# Patient Record
Sex: MALE | Race: WHITE | NOT HISPANIC OR LATINO | Employment: FULL TIME | URBAN - METROPOLITAN AREA
[De-identification: names, ages, dates, MRNs, and addresses within clinical notes are randomized per-mention and may not be internally consistent; named-entity substitution may affect disease eponyms.]

---

## 2021-06-23 ENCOUNTER — HOSPITAL ENCOUNTER (OUTPATIENT)
Dept: CARDIOLOGY | Facility: HOSPITAL | Age: 51
Discharge: HOME | End: 2021-06-23

## 2021-06-23 ENCOUNTER — LAB (OUTPATIENT)
Dept: LAB | Facility: HOSPITAL | Age: 51
End: 2021-06-23

## 2021-06-23 ENCOUNTER — TRANSCRIBE ORDERS (OUTPATIENT)
Dept: ADMINISTRATIVE | Facility: HOSPITAL | Age: 51
End: 2021-06-23

## 2021-06-23 ENCOUNTER — HOSPITAL ENCOUNTER (OUTPATIENT)
Dept: GENERAL RADIOLOGY | Facility: HOSPITAL | Age: 51
Discharge: HOME | End: 2021-06-23

## 2021-06-23 DIAGNOSIS — Z01.818 OTHER SPECIFIED PRE-OPERATIVE EXAMINATION: Primary | ICD-10-CM

## 2021-06-23 DIAGNOSIS — Z01.818 OTHER SPECIFIED PRE-OPERATIVE EXAMINATION: ICD-10-CM

## 2021-06-23 LAB
ABO GROUP BLD: NORMAL
ALBUMIN SERPL-MCNC: 5 G/DL (ref 3.5–5.2)
ANION GAP SERPL CALCULATED.3IONS-SCNC: 11.5 MMOL/L (ref 5–15)
APTT PPP: 24.2 SECONDS (ref 24–31)
BLD GP AB SCN SERPL QL: NEGATIVE
BUN SERPL-MCNC: 18 MG/DL (ref 6–20)
BUN/CREAT SERPL: 25 (ref 7–25)
CALCIUM SPEC-SCNC: 9.7 MG/DL (ref 8.6–10.5)
CHLORIDE SERPL-SCNC: 98 MMOL/L (ref 98–107)
CO2 SERPL-SCNC: 29.5 MMOL/L (ref 22–29)
CREAT SERPL-MCNC: 0.72 MG/DL (ref 0.76–1.27)
DEPRECATED RDW RBC AUTO: 40.1 FL (ref 37–54)
ERYTHROCYTE [DISTWIDTH] IN BLOOD BY AUTOMATED COUNT: 12.1 % (ref 12.3–15.4)
GFR SERPL CREATININE-BSD FRML MDRD: 116 ML/MIN/1.73
GLUCOSE SERPL-MCNC: 91 MG/DL (ref 65–99)
HBA1C MFR BLD: 5.3 % (ref 3.5–5.6)
HCT VFR BLD AUTO: 49.4 % (ref 37.5–51)
HGB BLD-MCNC: 16.9 G/DL (ref 13–17.7)
INR PPP: 0.98 (ref 0.93–1.1)
MCH RBC QN AUTO: 30.8 PG (ref 26.6–33)
MCHC RBC AUTO-ENTMCNC: 34.2 G/DL (ref 31.5–35.7)
MCV RBC AUTO: 90.1 FL (ref 79–97)
MRSA DNA SPEC QL NAA+PROBE: NORMAL
PLATELET # BLD AUTO: 278 10*3/MM3 (ref 140–450)
PMV BLD AUTO: 9.7 FL (ref 6–12)
POTASSIUM SERPL-SCNC: 4.4 MMOL/L (ref 3.5–5.2)
PROTHROMBIN TIME: 10.9 SECONDS (ref 9.6–11.7)
RBC # BLD AUTO: 5.48 10*6/MM3 (ref 4.14–5.8)
RH BLD: NEGATIVE
SODIUM SERPL-SCNC: 139 MMOL/L (ref 136–145)
T&S EXPIRATION DATE: NORMAL
WBC # BLD AUTO: 6.9 10*3/MM3 (ref 3.4–10.8)

## 2021-06-23 PROCEDURE — 85027 COMPLETE CBC AUTOMATED: CPT | Performed by: ORTHOPAEDIC SURGERY

## 2021-06-23 PROCEDURE — 80048 BASIC METABOLIC PNL TOTAL CA: CPT | Performed by: ORTHOPAEDIC SURGERY

## 2021-06-23 PROCEDURE — 86900 BLOOD TYPING SEROLOGIC ABO: CPT | Performed by: ORTHOPAEDIC SURGERY

## 2021-06-23 PROCEDURE — 85730 THROMBOPLASTIN TIME PARTIAL: CPT | Performed by: ORTHOPAEDIC SURGERY

## 2021-06-23 PROCEDURE — 86850 RBC ANTIBODY SCREEN: CPT | Performed by: ORTHOPAEDIC SURGERY

## 2021-06-23 PROCEDURE — 86900 BLOOD TYPING SEROLOGIC ABO: CPT

## 2021-06-23 PROCEDURE — 82040 ASSAY OF SERUM ALBUMIN: CPT

## 2021-06-23 PROCEDURE — 36415 COLL VENOUS BLD VENIPUNCTURE: CPT | Performed by: ORTHOPAEDIC SURGERY

## 2021-06-23 PROCEDURE — 83036 HEMOGLOBIN GLYCOSYLATED A1C: CPT | Performed by: ORTHOPAEDIC SURGERY

## 2021-06-23 PROCEDURE — 93005 ELECTROCARDIOGRAM TRACING: CPT

## 2021-06-23 PROCEDURE — 87641 MR-STAPH DNA AMP PROBE: CPT

## 2021-06-23 PROCEDURE — 85610 PROTHROMBIN TIME: CPT | Performed by: ORTHOPAEDIC SURGERY

## 2021-06-23 PROCEDURE — 86901 BLOOD TYPING SEROLOGIC RH(D): CPT | Performed by: ORTHOPAEDIC SURGERY

## 2021-06-23 PROCEDURE — 93010 ELECTROCARDIOGRAM REPORT: CPT | Performed by: INTERNAL MEDICINE

## 2021-06-23 PROCEDURE — 71046 X-RAY EXAM CHEST 2 VIEWS: CPT

## 2021-06-23 PROCEDURE — 86901 BLOOD TYPING SEROLOGIC RH(D): CPT

## 2021-06-23 RX ORDER — LISINOPRIL 10 MG/1
10 TABLET ORAL DAILY
COMMUNITY

## 2021-06-23 RX ORDER — ACETAMINOPHEN 325 MG/1
650 TABLET ORAL EVERY 6 HOURS PRN
COMMUNITY

## 2021-06-24 LAB — QT INTERVAL: 466 MS

## 2021-06-25 ENCOUNTER — LAB (OUTPATIENT)
Dept: LAB | Facility: HOSPITAL | Age: 51
End: 2021-06-25

## 2021-06-25 LAB — SARS-COV-2 ORF1AB RESP QL NAA+PROBE: NOT DETECTED

## 2021-06-25 PROCEDURE — U0004 COV-19 TEST NON-CDC HGH THRU: HCPCS

## 2021-06-25 PROCEDURE — C9803 HOPD COVID-19 SPEC COLLECT: HCPCS

## 2021-06-28 ENCOUNTER — HOSPITAL ENCOUNTER (OUTPATIENT)
Facility: HOSPITAL | Age: 51
Setting detail: HOSPITAL OUTPATIENT SURGERY
Discharge: HOME | End: 2021-06-28
Attending: ORTHOPAEDIC SURGERY | Admitting: ORTHOPAEDIC SURGERY

## 2021-06-28 ENCOUNTER — APPOINTMENT (OUTPATIENT)
Dept: GENERAL RADIOLOGY | Facility: HOSPITAL | Age: 51
End: 2021-06-28

## 2021-06-28 ENCOUNTER — ANESTHESIA (OUTPATIENT)
Dept: PERIOP | Facility: HOSPITAL | Age: 51
End: 2021-06-28

## 2021-06-28 ENCOUNTER — ANESTHESIA EVENT (OUTPATIENT)
Dept: PERIOP | Facility: HOSPITAL | Age: 51
End: 2021-06-28

## 2021-06-28 VITALS
HEART RATE: 56 BPM | SYSTOLIC BLOOD PRESSURE: 103 MMHG | TEMPERATURE: 98 F | RESPIRATION RATE: 16 BRPM | WEIGHT: 175 LBS | OXYGEN SATURATION: 94 % | BODY MASS INDEX: 27.47 KG/M2 | HEIGHT: 67 IN | DIASTOLIC BLOOD PRESSURE: 56 MMHG

## 2021-06-28 DIAGNOSIS — Z87.81 S/P ORIF (OPEN REDUCTION INTERNAL FIXATION) FRACTURE: Primary | ICD-10-CM

## 2021-06-28 DIAGNOSIS — Z98.890 S/P ORIF (OPEN REDUCTION INTERNAL FIXATION) FRACTURE: Primary | ICD-10-CM

## 2021-06-28 PROCEDURE — C1713 ANCHOR/SCREW BN/BN,TIS/BN: HCPCS | Performed by: ORTHOPAEDIC SURGERY

## 2021-06-28 PROCEDURE — 76000 FLUOROSCOPY <1 HR PHYS/QHP: CPT

## 2021-06-28 PROCEDURE — 25010000002 MAGNESIUM SULFATE PER 500 MG OF MAGNESIUM: Performed by: NURSE ANESTHETIST, CERTIFIED REGISTERED

## 2021-06-28 PROCEDURE — 25010000002 MIDAZOLAM PER 1 MG: Performed by: NURSE ANESTHETIST, CERTIFIED REGISTERED

## 2021-06-28 PROCEDURE — 25010000002 NEOSTIGMINE 5 MG/5ML SOLUTION: Performed by: NURSE ANESTHETIST, CERTIFIED REGISTERED

## 2021-06-28 PROCEDURE — 73000 X-RAY EXAM OF COLLAR BONE: CPT

## 2021-06-28 PROCEDURE — 25010000002 ROPIVACAINE PER 1 MG: Performed by: ORTHOPAEDIC SURGERY

## 2021-06-28 PROCEDURE — C1889 IMPLANT/INSERT DEVICE, NOC: HCPCS | Performed by: ORTHOPAEDIC SURGERY

## 2021-06-28 PROCEDURE — 25010000002 ONDANSETRON PER 1 MG: Performed by: NURSE ANESTHETIST, CERTIFIED REGISTERED

## 2021-06-28 PROCEDURE — 25010000002 PROPOFOL 10 MG/ML EMULSION: Performed by: NURSE ANESTHETIST, CERTIFIED REGISTERED

## 2021-06-28 DEVICE — DEV WND/CLS CONTRL TISS STRATAFIX SYMM PDS PLS CTX 60CM VIL: Type: IMPLANTABLE DEVICE | Site: CLAVICLE | Status: FUNCTIONAL

## 2021-06-28 DEVICE — BONE SCREW
Type: IMPLANTABLE DEVICE | Site: CLAVICLE | Status: FUNCTIONAL
Brand: VARIAX

## 2021-06-28 DEVICE — SUPERIOR PLATE - DECREASED CURVATURE
Type: IMPLANTABLE DEVICE | Site: CLAVICLE | Status: FUNCTIONAL
Brand: VARIAX

## 2021-06-28 RX ORDER — PROPOFOL 10 MG/ML
VIAL (ML) INTRAVENOUS AS NEEDED
Status: DISCONTINUED | OUTPATIENT
Start: 2021-06-28 | End: 2021-06-28 | Stop reason: SURG

## 2021-06-28 RX ORDER — MIDAZOLAM HYDROCHLORIDE 1 MG/ML
1 INJECTION INTRAMUSCULAR; INTRAVENOUS
Status: DISCONTINUED | OUTPATIENT
Start: 2021-06-28 | End: 2021-06-28 | Stop reason: HOSPADM

## 2021-06-28 RX ORDER — MIDAZOLAM HYDROCHLORIDE 1 MG/ML
INJECTION INTRAMUSCULAR; INTRAVENOUS AS NEEDED
Status: DISCONTINUED | OUTPATIENT
Start: 2021-06-28 | End: 2021-06-28 | Stop reason: SURG

## 2021-06-28 RX ORDER — OXYCODONE HCL 10 MG/1
10 TABLET, FILM COATED, EXTENDED RELEASE ORAL ONCE
Status: COMPLETED | OUTPATIENT
Start: 2021-06-28 | End: 2021-06-28

## 2021-06-28 RX ORDER — CELECOXIB 200 MG/1
200 CAPSULE ORAL ONCE
Status: COMPLETED | OUTPATIENT
Start: 2021-06-28 | End: 2021-06-28

## 2021-06-28 RX ORDER — NALOXONE HCL 0.4 MG/ML
0.4 VIAL (ML) INJECTION AS NEEDED
Status: DISCONTINUED | OUTPATIENT
Start: 2021-06-28 | End: 2021-06-28 | Stop reason: HOSPADM

## 2021-06-28 RX ORDER — ROPIVACAINE HYDROCHLORIDE 5 MG/ML
INJECTION, SOLUTION EPIDURAL; INFILTRATION; PERINEURAL AS NEEDED
Status: DISCONTINUED | OUTPATIENT
Start: 2021-06-28 | End: 2021-06-28 | Stop reason: HOSPADM

## 2021-06-28 RX ORDER — ROCURONIUM BROMIDE 10 MG/ML
INJECTION, SOLUTION INTRAVENOUS AS NEEDED
Status: DISCONTINUED | OUTPATIENT
Start: 2021-06-28 | End: 2021-06-28 | Stop reason: SURG

## 2021-06-28 RX ORDER — DROPERIDOL 2.5 MG/ML
1.25 INJECTION, SOLUTION INTRAMUSCULAR; INTRAVENOUS ONCE AS NEEDED
Status: DISCONTINUED | OUTPATIENT
Start: 2021-06-28 | End: 2021-06-28 | Stop reason: HOSPADM

## 2021-06-28 RX ORDER — ACETAMINOPHEN 500 MG
1000 TABLET ORAL ONCE
Status: COMPLETED | OUTPATIENT
Start: 2021-06-28 | End: 2021-06-28

## 2021-06-28 RX ORDER — MAGNESIUM SULFATE HEPTAHYDRATE 500 MG/ML
INJECTION, SOLUTION INTRAMUSCULAR; INTRAVENOUS AS NEEDED
Status: DISCONTINUED | OUTPATIENT
Start: 2021-06-28 | End: 2021-06-28 | Stop reason: SURG

## 2021-06-28 RX ORDER — SODIUM CHLORIDE 0.9 % (FLUSH) 0.9 %
10 SYRINGE (ML) INJECTION AS NEEDED
Status: DISCONTINUED | OUTPATIENT
Start: 2021-06-28 | End: 2021-06-28 | Stop reason: HOSPADM

## 2021-06-28 RX ORDER — TRANEXAMIC ACID 10 MG/ML
1000 INJECTION, SOLUTION INTRAVENOUS ONCE
Status: COMPLETED | OUTPATIENT
Start: 2021-06-28 | End: 2021-06-28

## 2021-06-28 RX ORDER — HYDROCODONE BITARTRATE AND ACETAMINOPHEN 7.5; 325 MG/1; MG/1
1 TABLET ORAL EVERY 6 HOURS PRN
Qty: 30 TABLET | Refills: 0 | Status: SHIPPED | OUTPATIENT
Start: 2021-06-28

## 2021-06-28 RX ORDER — LIDOCAINE HYDROCHLORIDE 10 MG/ML
0.5 INJECTION, SOLUTION INFILTRATION; PERINEURAL ONCE AS NEEDED
Status: DISCONTINUED | OUTPATIENT
Start: 2021-06-28 | End: 2021-06-28 | Stop reason: HOSPADM

## 2021-06-28 RX ORDER — IPRATROPIUM BROMIDE AND ALBUTEROL SULFATE 2.5; .5 MG/3ML; MG/3ML
3 SOLUTION RESPIRATORY (INHALATION) ONCE AS NEEDED
Status: DISCONTINUED | OUTPATIENT
Start: 2021-06-28 | End: 2021-06-28 | Stop reason: HOSPADM

## 2021-06-28 RX ORDER — ACETAMINOPHEN 325 MG/1
650 TABLET ORAL ONCE AS NEEDED
Status: DISCONTINUED | OUTPATIENT
Start: 2021-06-28 | End: 2021-06-28 | Stop reason: HOSPADM

## 2021-06-28 RX ORDER — PHENYLEPHRINE HCL IN 0.9% NACL 1 MG/10 ML
SYRINGE (ML) INTRAVENOUS AS NEEDED
Status: DISCONTINUED | OUTPATIENT
Start: 2021-06-28 | End: 2021-06-28 | Stop reason: SURG

## 2021-06-28 RX ORDER — SODIUM CHLORIDE, SODIUM LACTATE, POTASSIUM CHLORIDE, CALCIUM CHLORIDE 600; 310; 30; 20 MG/100ML; MG/100ML; MG/100ML; MG/100ML
1000 INJECTION, SOLUTION INTRAVENOUS CONTINUOUS
Status: DISCONTINUED | OUTPATIENT
Start: 2021-06-28 | End: 2021-06-28 | Stop reason: HOSPADM

## 2021-06-28 RX ORDER — TRAMADOL HYDROCHLORIDE 50 MG/1
50 TABLET ORAL EVERY 6 HOURS PRN
Qty: 30 TABLET | Refills: 0 | Status: SHIPPED | OUTPATIENT
Start: 2021-06-28

## 2021-06-28 RX ORDER — ONDANSETRON 2 MG/ML
4 INJECTION INTRAMUSCULAR; INTRAVENOUS ONCE AS NEEDED
Status: DISCONTINUED | OUTPATIENT
Start: 2021-06-28 | End: 2021-06-28 | Stop reason: HOSPADM

## 2021-06-28 RX ORDER — GLYCOPYRROLATE 1 MG/5 ML
SYRINGE (ML) INTRAVENOUS AS NEEDED
Status: DISCONTINUED | OUTPATIENT
Start: 2021-06-28 | End: 2021-06-28 | Stop reason: SURG

## 2021-06-28 RX ORDER — ONDANSETRON 2 MG/ML
INJECTION INTRAMUSCULAR; INTRAVENOUS AS NEEDED
Status: DISCONTINUED | OUTPATIENT
Start: 2021-06-28 | End: 2021-06-28 | Stop reason: SURG

## 2021-06-28 RX ORDER — ONDANSETRON 4 MG/1
4 TABLET, FILM COATED ORAL EVERY 8 HOURS PRN
Qty: 10 TABLET | Refills: 0 | Status: SHIPPED | OUTPATIENT
Start: 2021-06-28

## 2021-06-28 RX ORDER — FLUMAZENIL 0.1 MG/ML
0.1 INJECTION INTRAVENOUS AS NEEDED
Status: DISCONTINUED | OUTPATIENT
Start: 2021-06-28 | End: 2021-06-28 | Stop reason: HOSPADM

## 2021-06-28 RX ORDER — ACETAMINOPHEN 650 MG/1
650 SUPPOSITORY RECTAL ONCE AS NEEDED
Status: DISCONTINUED | OUTPATIENT
Start: 2021-06-28 | End: 2021-06-28 | Stop reason: HOSPADM

## 2021-06-28 RX ORDER — IBUPROFEN 400 MG/1
600 TABLET ORAL ONCE AS NEEDED
Status: DISCONTINUED | OUTPATIENT
Start: 2021-06-28 | End: 2021-06-28 | Stop reason: HOSPADM

## 2021-06-28 RX ORDER — ENALAPRILAT 2.5 MG/2ML
1.25 INJECTION INTRAVENOUS ONCE AS NEEDED
Status: DISCONTINUED | OUTPATIENT
Start: 2021-06-28 | End: 2021-06-28 | Stop reason: HOSPADM

## 2021-06-28 RX ORDER — LABETALOL HYDROCHLORIDE 5 MG/ML
5 INJECTION, SOLUTION INTRAVENOUS
Status: DISCONTINUED | OUTPATIENT
Start: 2021-06-28 | End: 2021-06-28 | Stop reason: HOSPADM

## 2021-06-28 RX ORDER — LIDOCAINE HYDROCHLORIDE 20 MG/ML
INJECTION, SOLUTION EPIDURAL; INFILTRATION; INTRACAUDAL; PERINEURAL AS NEEDED
Status: DISCONTINUED | OUTPATIENT
Start: 2021-06-28 | End: 2021-06-28 | Stop reason: SURG

## 2021-06-28 RX ORDER — FENTANYL CITRATE 50 UG/ML
25 INJECTION, SOLUTION INTRAMUSCULAR; INTRAVENOUS
Status: DISCONTINUED | OUTPATIENT
Start: 2021-06-28 | End: 2021-06-28 | Stop reason: HOSPADM

## 2021-06-28 RX ORDER — SODIUM CHLORIDE, SODIUM LACTATE, POTASSIUM CHLORIDE, CALCIUM CHLORIDE 600; 310; 30; 20 MG/100ML; MG/100ML; MG/100ML; MG/100ML
100 INJECTION, SOLUTION INTRAVENOUS CONTINUOUS
Status: DISCONTINUED | OUTPATIENT
Start: 2021-06-28 | End: 2021-06-28 | Stop reason: HOSPADM

## 2021-06-28 RX ORDER — LORAZEPAM 2 MG/ML
1 INJECTION INTRAMUSCULAR
Status: DISCONTINUED | OUTPATIENT
Start: 2021-06-28 | End: 2021-06-28 | Stop reason: HOSPADM

## 2021-06-28 RX ORDER — DROPERIDOL 2.5 MG/ML
0.62 INJECTION, SOLUTION INTRAMUSCULAR; INTRAVENOUS ONCE AS NEEDED
Status: DISCONTINUED | OUTPATIENT
Start: 2021-06-28 | End: 2021-06-28 | Stop reason: HOSPADM

## 2021-06-28 RX ORDER — NEOSTIGMINE METHYLSULFATE 5 MG/5 ML
SYRINGE (ML) INTRAVENOUS AS NEEDED
Status: DISCONTINUED | OUTPATIENT
Start: 2021-06-28 | End: 2021-06-28 | Stop reason: SURG

## 2021-06-28 RX ORDER — MEPERIDINE HYDROCHLORIDE 25 MG/ML
12.5 INJECTION INTRAMUSCULAR; INTRAVENOUS; SUBCUTANEOUS
Status: DISCONTINUED | OUTPATIENT
Start: 2021-06-28 | End: 2021-06-28 | Stop reason: HOSPADM

## 2021-06-28 RX ORDER — KETAMINE HYDROCHLORIDE 50 MG/ML
INJECTION, SOLUTION, CONCENTRATE INTRAMUSCULAR; INTRAVENOUS AS NEEDED
Status: DISCONTINUED | OUTPATIENT
Start: 2021-06-28 | End: 2021-06-28 | Stop reason: SURG

## 2021-06-28 RX ORDER — FENTANYL CITRATE 50 UG/ML
50 INJECTION, SOLUTION INTRAMUSCULAR; INTRAVENOUS
Status: DISCONTINUED | OUTPATIENT
Start: 2021-06-28 | End: 2021-06-28 | Stop reason: HOSPADM

## 2021-06-28 RX ORDER — EPHEDRINE SULFATE 50 MG/ML
INJECTION INTRAVENOUS AS NEEDED
Status: DISCONTINUED | OUTPATIENT
Start: 2021-06-28 | End: 2021-06-28 | Stop reason: SURG

## 2021-06-28 RX ORDER — EPHEDRINE SULFATE 50 MG/ML
5 INJECTION, SOLUTION INTRAVENOUS ONCE AS NEEDED
Status: DISCONTINUED | OUTPATIENT
Start: 2021-06-28 | End: 2021-06-28 | Stop reason: HOSPADM

## 2021-06-28 RX ADMIN — Medication 0.8 MG: at 15:00

## 2021-06-28 RX ADMIN — KETAMINE HYDROCHLORIDE 30 MG: 50 INJECTION, SOLUTION INTRAMUSCULAR; INTRAVENOUS at 13:20

## 2021-06-28 RX ADMIN — LIDOCAINE HYDROCHLORIDE 200 MG: 20 INJECTION, SOLUTION EPIDURAL; INFILTRATION; INTRACAUDAL; PERINEURAL at 13:20

## 2021-06-28 RX ADMIN — MIDAZOLAM 2 MG: 1 INJECTION INTRAMUSCULAR; INTRAVENOUS at 13:42

## 2021-06-28 RX ADMIN — CEFAZOLIN SODIUM 2 G: 1 INJECTION, POWDER, FOR SOLUTION INTRAMUSCULAR; INTRAVENOUS at 13:20

## 2021-06-28 RX ADMIN — ROCURONIUM BROMIDE 20 MG: 10 INJECTION INTRAVENOUS at 14:03

## 2021-06-28 RX ADMIN — KETAMINE HYDROCHLORIDE 20 MG: 50 INJECTION, SOLUTION INTRAMUSCULAR; INTRAVENOUS at 13:16

## 2021-06-28 RX ADMIN — Medication 50 MCG: at 14:07

## 2021-06-28 RX ADMIN — CELECOXIB 200 MG: 200 CAPSULE ORAL at 12:29

## 2021-06-28 RX ADMIN — ONDANSETRON 4 MG: 2 INJECTION INTRAMUSCULAR; INTRAVENOUS at 14:48

## 2021-06-28 RX ADMIN — EPHEDRINE SULFATE 5 MG: 50 INJECTION INTRAVENOUS at 14:28

## 2021-06-28 RX ADMIN — Medication 50 MCG: at 14:10

## 2021-06-28 RX ADMIN — Medication 50 MCG: at 14:04

## 2021-06-28 RX ADMIN — MIDAZOLAM 2 MG: 1 INJECTION INTRAMUSCULAR; INTRAVENOUS at 13:11

## 2021-06-28 RX ADMIN — EPHEDRINE SULFATE 5 MG: 50 INJECTION INTRAVENOUS at 14:23

## 2021-06-28 RX ADMIN — ROCURONIUM BROMIDE 50 MG: 10 INJECTION INTRAVENOUS at 13:21

## 2021-06-28 RX ADMIN — PROPOFOL 150 MCG/KG/MIN: 10 INJECTION, EMULSION INTRAVENOUS at 13:30

## 2021-06-28 RX ADMIN — Medication 5 MG: at 15:00

## 2021-06-28 RX ADMIN — ACETAMINOPHEN 1000 MG: 500 TABLET, FILM COATED ORAL at 12:29

## 2021-06-28 RX ADMIN — Medication 50 MCG: at 14:02

## 2021-06-28 RX ADMIN — OXYCODONE HYDROCHLORIDE 10 MG: 10 TABLET, FILM COATED, EXTENDED RELEASE ORAL at 12:29

## 2021-06-28 RX ADMIN — ROCURONIUM BROMIDE 20 MG: 10 INJECTION INTRAVENOUS at 14:35

## 2021-06-28 RX ADMIN — TRANEXAMIC ACID 1000 MG: 10 INJECTION, SOLUTION INTRAVENOUS at 13:30

## 2021-06-28 RX ADMIN — SODIUM CHLORIDE, POTASSIUM CHLORIDE, SODIUM LACTATE AND CALCIUM CHLORIDE 1000 ML: 600; 310; 30; 20 INJECTION, SOLUTION INTRAVENOUS at 11:15

## 2021-06-28 RX ADMIN — DEXMEDETOMIDINE HYDROCHLORIDE 0.5 MCG/KG/HR: 100 INJECTION, SOLUTION INTRAVENOUS at 13:15

## 2021-06-28 RX ADMIN — MAGNESIUM SULFATE HEPTAHYDRATE 1 G: 500 INJECTION, SOLUTION INTRAMUSCULAR; INTRAVENOUS at 13:20

## 2021-06-28 RX ADMIN — PROPOFOL 200 MG: 10 INJECTION, EMULSION INTRAVENOUS at 13:21

## 2021-06-28 NOTE — ANESTHESIA POSTPROCEDURE EVALUATION
Patient: Juan Couch    Procedure Summary     Date: 06/28/21 Room / Location: [facility] OR  / [facility] MAIN OR    Anesthesia Start: 1310 Anesthesia Stop: 1527    Procedures:       RIGHT CLAVICLE AND ORIF (Right Shoulder)      PECTORALIS MAJOR REPAIR (Right Arm Upper) Diagnosis:       Closed fracture of clavicle      (Closed fracture of clavicle [S42.009A])    Surgeons: Jimmie Montilla MD Provider: Fausto Preciado MD    Anesthesia Type: general ASA Status: 2          Anesthesia Type: general    Vitals  Vitals Value Taken Time   /61 06/28/21 1634   Temp 97.6 °F (36.4 °C) 06/28/21 1634   Pulse 57 06/28/21 1636   Resp 12 06/28/21 1634   SpO2 95 % 06/28/21 1636   Vitals shown include unvalidated device data.        Post Anesthesia Care and Evaluation    Patient location during evaluation: PACU  Patient participation: complete - patient participated  Level of consciousness: awake and responsive to verbal stimuli  Pain scale: See nurse's notes for pain score.  Pain management: adequate  Airway patency: patent  Anesthetic complications: No anesthetic complications  PONV Status: none  Cardiovascular status: acceptable  Respiratory status: acceptable  Hydration status: acceptable    Comments: Patient seen and examined postoperatively; vital signs stable; SpO2 greater than or equal to 90%; cardiopulmonary status stable; nausea/vomiting adequately controlled; pain adequately controlled; no apparent anesthesia complications; patient discharged from anesthesia care when discharge criteria were met

## 2021-06-28 NOTE — ANESTHESIA PROCEDURE NOTES
Airway  Urgency: elective    Date/Time: 6/28/2021 1:23 PM  Airway not difficult    General Information and Staff    Patient location during procedure: OR  Anesthesiologist: Fausto Preciado MD  CRNA: Rosy Mccurdy CRNA    Indications and Patient Condition  Indications for airway management: airway protection    Preoxygenated: yes  MILS maintained throughout  Mask difficulty assessment: 1 - vent by mask    Final Airway Details  Final airway type: endotracheal airway      Successful airway: ETT  Cuffed: yes   Successful intubation technique: direct laryngoscopy  Facilitating devices/methods: intubating stylet  Endotracheal tube insertion site: oral  Blade: Oral  Blade size: 4  ETT size (mm): 7.5  Cormack-Lehane Classification: grade I - full view of glottis  Placement verified by: chest auscultation, capnometry and palpation of cuff   Measured from: lips  ETT/EBT  to lips (cm): 22  Number of attempts at approach: 1  Assessment: lips, teeth, and gum same as pre-op and atraumatic intubation
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
## 2024-10-04 ENCOUNTER — OFFICE VISIT (OUTPATIENT)
Dept: FAMILY MEDICINE CLINIC | Facility: CLINIC | Age: 54
End: 2024-10-04
Payer: COMMERCIAL

## 2024-10-04 VITALS
TEMPERATURE: 97.9 F | DIASTOLIC BLOOD PRESSURE: 138 MMHG | BODY MASS INDEX: 29.51 KG/M2 | HEIGHT: 67 IN | HEART RATE: 109 BPM | WEIGHT: 188 LBS | SYSTOLIC BLOOD PRESSURE: 220 MMHG | RESPIRATION RATE: 18 BRPM | OXYGEN SATURATION: 97 %

## 2024-10-04 DIAGNOSIS — I10 ELEVATED BLOOD PRESSURE READING WITH DIAGNOSIS OF HYPERTENSION: ICD-10-CM

## 2024-10-04 DIAGNOSIS — I51.7 LEFT VENTRICULAR HYPERTROPHY: ICD-10-CM

## 2024-10-04 DIAGNOSIS — I49.8 SINUS ARRHYTHMIA: ICD-10-CM

## 2024-10-04 DIAGNOSIS — Z13.31 SCREENING FOR DEPRESSION: ICD-10-CM

## 2024-10-04 DIAGNOSIS — N52.9 ERECTILE DYSFUNCTION, UNSPECIFIED ERECTILE DYSFUNCTION TYPE: ICD-10-CM

## 2024-10-04 DIAGNOSIS — Z78.9 CAFFEINE USE: ICD-10-CM

## 2024-10-04 DIAGNOSIS — Z12.5 SCREENING PSA (PROSTATE SPECIFIC ANTIGEN): ICD-10-CM

## 2024-10-04 DIAGNOSIS — Z72.0 SMOKELESS TOBACCO USE: ICD-10-CM

## 2024-10-04 DIAGNOSIS — R94.31 ABNORMAL EKG: ICD-10-CM

## 2024-10-04 DIAGNOSIS — R79.89 LOW TESTOSTERONE IN MALE: ICD-10-CM

## 2024-10-04 DIAGNOSIS — Z00.00 ENCOUNTER FOR MEDICAL EXAMINATION TO ESTABLISH CARE: Primary | ICD-10-CM

## 2024-10-04 RX ORDER — TESTOSTERONE CYPIONATE 200 MG/ML
INJECTION, SOLUTION INTRAMUSCULAR
COMMUNITY
Start: 2024-08-16

## 2024-10-04 RX ORDER — LISINOPRIL 20 MG/1
20 TABLET ORAL DAILY
Qty: 90 TABLET | Refills: 0 | Status: SHIPPED | OUTPATIENT
Start: 2024-10-04

## 2024-10-04 RX ORDER — SILDENAFIL 25 MG/1
50 TABLET, FILM COATED ORAL DAILY PRN
Qty: 12 TABLET | Refills: 2 | Status: SHIPPED | OUTPATIENT
Start: 2024-10-04

## 2024-10-07 NOTE — PROGRESS NOTES
"Juan Couch  [id number]  1970  male     10/04/2024      Chief Complaint  Establish Care    History of Present Illness  54-year-old male patient presents today to establish care.  Screened for depression, denies feeling down.  Patient states he has a history of hypertension and low testosterone.  States he has run out of his lisinopril blood pressure medication.  Was taking 10 mg lisinopril.  States he used to take losartan prior to taking lisinopril.  Needing lisinopril refilled.  Patient's blood pressure very elevated today.  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  States he does workout lifting weights almost every day and takes preworkout prior to each workout.  States his preworkout makes him feel like his \"head is crawling.\"  He denies smoking tobacco, occasional alcohol drinker, does admit to smokeless tobacco use.    Patient states his previous PCP Dr. Lovell through [facility] primary care recommended patient to decrease his testosterone medication.  States he does not know what his last testosterone levels were.  Patient not fasting today.  Agrees to obtain fasting labs this weekend and requested to be done at [facility].  Will check testosterone then.  States he has not had a recent PSA level, agrees to check that as well.  Patient states he feels great now since starting testosterone as he had a low testosterone level prior to starting testosterone treatment.  Patient states he has been having some erectile dysfunction.  Would like to try medication to help with this.  Patient agrees to obtain EKG today due to elevated blood pressure.  Denies headache, lightheadedness, dizziness, numbness, tingling, blurry vision, tinnitus, indigestion, cough, shortness of breath, chest pain, palpitations, leg swelling, abdominal pain, NVD, dysuria, rash.      Review of Systems   Constitutional: " "Negative.    HENT: Negative.     Eyes: Negative.    Respiratory: Negative.     Cardiovascular: Negative.    Gastrointestinal: Negative.    Endocrine: Negative.    Genitourinary: Negative.    Musculoskeletal: Negative.    Skin: Negative.    Allergic/Immunologic: Negative.    Neurological: Negative.    Hematological: Negative.    Psychiatric/Behavioral: Negative.         Past Medical History:   Diagnosis Date    Fractured shoulder 06/16/2021    right    Hypertension        Past Surgical History:   Procedure Laterality Date    CLAVICLE OPEN REDUCTION INTERNAL FIXATION Right 6/28/2021    Procedure: RIGHT CLAVICLE AND ORIF;  Surgeon: Jimmie Montilla MD;  Location: [facility];  Service: Orthopedics;  Laterality: Right;    DISTAL BICEPS TENDON REPAIR Right 6/28/2021    Procedure: PECTORALIS MAJOR REPAIR;  Surgeon: Jimmie Montilla MD;  Location: [facility];  Service: Orthopedics;  Laterality: Right;       Family History   Problem Relation Age of Onset    Stroke Mother     Stroke Maternal Grandfather        Social History     Socioeconomic History    Marital status:    Tobacco Use    Smoking status: Never    Smokeless tobacco: Current     Types: Snuff   Vaping Use    Vaping status: Every Day    Substances: Flavoring    Devices: Disposable   Substance and Sexual Activity    Alcohol use: Yes     Comment: Rare    Drug use: Never    Sexual activity: Defer        No Known Allergies      Objective   Vital Signs:   BP (!) 220/138 (BP Location: Right arm, Patient Position: Sitting, Cuff Size: Adult)   Pulse 109   Temp 97.9 °F (36.6 °C) (Oral)   Resp 18   Ht 170.2 cm (67\")   Wt 85.3 kg (188 lb)   SpO2 97%   BMI 29.44 kg/m²       Physical Exam  Vitals and nursing note reviewed.   Constitutional:       General: He is not in acute distress.     Appearance: Normal appearance. He is not ill-appearing, toxic-appearing or diaphoretic.   HENT:      Head: Normocephalic and atraumatic.      Jaw: There is " normal jaw occlusion.      Right Ear: Hearing and external ear normal.      Left Ear: Hearing and external ear normal.      Nose: Nose normal.      Mouth/Throat:      Lips: Pink.   Eyes:      General: Lids are normal. Vision grossly intact. Gaze aligned appropriately.      Extraocular Movements: Extraocular movements intact.      Conjunctiva/sclera: Conjunctivae normal.      Pupils: Pupils are equal, round, and reactive to light.   Cardiovascular:      Rate and Rhythm: Normal rate. Rhythm regularly irregular.      Pulses: Normal pulses.           Carotid pulses are 2+ on the right side and 2+ on the left side.       Radial pulses are 2+ on the right side and 2+ on the left side.        Dorsalis pedis pulses are 2+ on the right side and 2+ on the left side.        Posterior tibial pulses are 2+ on the right side and 2+ on the left side.      Heart sounds: Normal heart sounds, S1 normal and S2 normal. No murmur heard.  Pulmonary:      Effort: Pulmonary effort is normal.      Breath sounds: Normal breath sounds and air entry.   Abdominal:      General: Abdomen is flat. Bowel sounds are normal. There is no distension or abdominal bruit.      Palpations: Abdomen is soft.      Tenderness: There is no abdominal tenderness.   Musculoskeletal:         General: Normal range of motion.      Cervical back: Full passive range of motion without pain, normal range of motion and neck supple.      Right lower leg: No edema.      Left lower leg: No edema.   Skin:     General: Skin is warm and dry.      Capillary Refill: Capillary refill takes less than 2 seconds.      Coloration: Skin is not pale.      Findings: No bruising, erythema or rash.   Neurological:      General: No focal deficit present.      Mental Status: He is alert and oriented to person, place, and time. Mental status is at baseline.      GCS: GCS eye subscore is 4. GCS verbal subscore is 5. GCS motor subscore is 6.      Cranial Nerves: Cranial nerves 2-12 are intact.  No cranial nerve deficit.      Sensory: Sensation is intact. No sensory deficit.      Motor: Motor function is intact. No weakness.      Coordination: Coordination is intact. Coordination normal.      Gait: Gait is intact. Gait normal.      Deep Tendon Reflexes: Reflexes normal.   Psychiatric:         Attention and Perception: Attention and perception normal.         Mood and Affect: Mood and affect normal.         Speech: Speech normal.         Behavior: Behavior normal. Behavior is cooperative.         Thought Content: Thought content normal.         Cognition and Memory: Cognition and memory normal.         Judgment: Judgment normal.                 Assessment and Plan   Diagnoses and all orders for this visit:    1. Encounter for medical examination to establish care [Z00.00] (Primary)    2. Erectile dysfunction, unspecified erectile dysfunction type  -     sildenafil (VIAGRA) 25 MG tablet; Take 2 tablets by mouth Daily As Needed for Erectile Dysfunction.  Dispense: 12 tablet; Refill: 2    3. Elevated blood pressure reading with diagnosis of hypertension  -     TSH Rfx On Abnormal To Free T4; Future  -     Lipid panel; Future  -     Hemoglobin A1c; Future  -     CBC w AUTO Differential; Future  -     Comprehensive metabolic panel; Future  -     lisinopril (PRINIVIL,ZESTRIL) 20 MG tablet; Take 1 tablet by mouth Daily.  Dispense: 90 tablet; Refill: 0  -     ECG 12 Lead    4. Screening PSA (prostate specific antigen)  -     PSA SCREENING; Future    5. Low testosterone in male  -     Testosterone (Free & Total), LC / MS; Future  -     PSA SCREENING; Future    6. Abnormal EKG    7. Left ventricular hypertrophy    8. Sinus arrhythmia    9. Caffeine use    10. Screening for depression    11. Smokeless tobacco use    Establish care  -Screened for depression, denies suicidal ideation/plan.    Elevated blood-pressure reading with diagnosis of hypertension  -Unstable blood pressure.  Discussed risk in great detail with  patient.  -Discussed potential etiology such as high testosterone levels, high consumption of preworkout/caffeine, hereditary, etc.  -Patient reports he has been off of his blood pressure medications recently.  -Patient advised to go to nearest ER today for further evaluation/treatment.  Advised to go via ambulance to ensure his safety.  Patient verbalized understanding and signed AMA as far as going to ER via ambulance.  -Pending ER evaluation/treatment, will start patient on 20 mg lisinopril daily.  -Patient will follow-up with me in 1 week for hypertension.  -Pending fasting labs that patient states he will get done this weekend at [facility].  -Advised patient to stop preworkout/caffeine.    Abnormal EKG  -Left ventricular hypertrophy noted.  -Sinus arrhythmia noted.  -Discussed potential etiology such as high testosterone levels, high consumption of preworkout/caffeine, hereditary, etc.  -Patient advised to go to nearest ER today for further evaluation/treatment.  Advised to go via ambulance to ensure his safety.  Patient verbalized understanding and signed AMA as far as going to ER via ambulance.    Low testosterone in male  -On testosterone.  -Pending fasting labs.    Erectile dysfunction  -Instructed patient to take 25 mg - 50 mg sildenafil daily as needed.    -Patient advised to go to nearest ER due to his elevated/unstable blood pressure today.  Patient recommended to go via ambulance to ER, patient verbalized understanding but signed AMA as far as going via ambulance.  States he will go to [facility] ER at this time.  Katherine advised to call and give report to [facility].  -Instructed patient to follow-up with me in 1 week for hypertension.      *I spent 45 minutes on this visit which includes time in the following preparing for visit, reviewing patient chart, examining patient, ordering test/imaging/labs, interpreting test (EKG) today, counseling/educating the  patient, referring when appropriate, ordering medications, documenting in patient chart, etc.    Follow Up   Return in about 1 week (around 10/11/2024) for Recheck.    There are no Patient Instructions on file for this visit.

## 2024-10-14 ENCOUNTER — OFFICE VISIT (OUTPATIENT)
Dept: FAMILY MEDICINE CLINIC | Facility: CLINIC | Age: 54
End: 2024-10-14
Payer: COMMERCIAL

## 2024-10-14 VITALS
TEMPERATURE: 100 F | SYSTOLIC BLOOD PRESSURE: 228 MMHG | RESPIRATION RATE: 18 BRPM | OXYGEN SATURATION: 97 % | WEIGHT: 194 LBS | BODY MASS INDEX: 30.45 KG/M2 | HEIGHT: 67 IN | HEART RATE: 94 BPM | DIASTOLIC BLOOD PRESSURE: 132 MMHG

## 2024-10-14 DIAGNOSIS — E66.811 CLASS 1 OBESITY DUE TO EXCESS CALORIES WITHOUT SERIOUS COMORBIDITY WITH BODY MASS INDEX (BMI) OF 30.0 TO 30.9 IN ADULT: ICD-10-CM

## 2024-10-14 DIAGNOSIS — E66.09 CLASS 1 OBESITY DUE TO EXCESS CALORIES WITHOUT SERIOUS COMORBIDITY WITH BODY MASS INDEX (BMI) OF 30.0 TO 30.9 IN ADULT: ICD-10-CM

## 2024-10-14 DIAGNOSIS — E29.1 HYPOGONADISM IN MALE: ICD-10-CM

## 2024-10-14 DIAGNOSIS — I51.7 LEFT VENTRICULAR HYPERTROPHY BY ELECTROCARDIOGRAM: ICD-10-CM

## 2024-10-14 DIAGNOSIS — I10 ELEVATED BLOOD PRESSURE READING WITH DIAGNOSIS OF HYPERTENSION: Primary | ICD-10-CM

## 2024-10-14 PROCEDURE — 99214 OFFICE O/P EST MOD 30 MIN: CPT | Performed by: NURSE PRACTITIONER

## 2024-10-14 RX ORDER — LISINOPRIL 40 MG/1
40 TABLET ORAL DAILY
Qty: 30 TABLET | Refills: 1 | Status: SHIPPED | OUTPATIENT
Start: 2024-10-14

## 2024-10-14 RX ORDER — CLONIDINE HYDROCHLORIDE 0.1 MG/1
0.1 TABLET ORAL 2 TIMES DAILY PRN
Qty: 90 TABLET | Refills: 0 | Status: SHIPPED | OUTPATIENT
Start: 2024-10-14

## 2024-10-14 NOTE — PROGRESS NOTES
Juan Couch  [id number]  1970  male     10/14/2024      Chief Complaint  Hypertension    History of Present Illness  54-year-old male patient presents today with his wife Maude to follow-up on hypertension.  Patient states he did go to [facility] ER after I last seen him 10 days ago due to elevated blood pressure reading of 220/138.  Patient had labs drawn while in the ER which were noted to be normal.  I reviewed those with patient today.  Patient was given clonidine while in the ER and states his blood pressure significantly improved before he was discharged from ER.  Patient states he did get his labs done that I ordered at last office visit.  I reviewed those today with patient as well which noted elevated testosterone levels.  States his testosterone is currently managed by Dr. Melendez at [facility].  I have requested those records and test results from their office.  Patient noted a blood pressure of 125/82 1 day last week while at home.  Patient believes his elevated blood pressure readings in office is due to him being very anxious about doctor appointment prior to coming here.  Denies headache, lightheadedness, dizziness, numbness, tingling, tinnitus, blurry vision, cough, shortness of breath, chest pain, palpitations, leg swelling, abdominal pain, NVD, dysuria, rash.  Hypertension        BMI is >= 30 and <35. (Class 1 Obesity). The following options were offered after discussion;: exercise counseling/recommendations and nutrition counseling/recommendations       Review of Systems   Constitutional: Negative.    HENT: Negative.     Eyes: Negative.    Respiratory: Negative.     Cardiovascular: Negative.    Gastrointestinal: Negative.    Endocrine: Negative.    Genitourinary: Negative.    Musculoskeletal: Negative.    Skin: Negative.    Allergic/Immunologic: Negative.    Neurological: Negative.    Hematological: Negative.    Psychiatric/Behavioral: Negative.         Past Medical  "History:   Diagnosis Date    Fractured shoulder 06/16/2021    right    Hypertension        Past Surgical History:   Procedure Laterality Date    CLAVICLE OPEN REDUCTION INTERNAL FIXATION Right 6/28/2021    Procedure: RIGHT CLAVICLE AND ORIF;  Surgeon: Jimmie Montilla MD;  Location: [facility] MAIN OR;  Service: Orthopedics;  Laterality: Right;    DISTAL BICEPS TENDON REPAIR Right 6/28/2021    Procedure: PECTORALIS MAJOR REPAIR;  Surgeon: Jimmie Montilla MD;  Location: [facility] MAIN OR;  Service: Orthopedics;  Laterality: Right;       Family History   Problem Relation Age of Onset    Hypertension Mother     Stroke Mother     Hypertension Brother     Hypertension Maternal Uncle     Stroke Maternal Uncle     Hypertension Maternal Grandfather     Stroke Maternal Grandfather        Social History     Socioeconomic History    Marital status:    Tobacco Use    Smoking status: Never    Smokeless tobacco: Current     Types: Snuff   Vaping Use    Vaping status: Every Day    Substances: Flavoring    Devices: Disposable   Substance and Sexual Activity    Alcohol use: Yes     Comment: Rare    Drug use: Never    Sexual activity: Defer        No Known Allergies      Objective   Vital Signs:   BP (!) 228/132 (BP Location: Right arm, Patient Position: Sitting, Cuff Size: Large Adult)   Pulse 94   Temp 100 °F (37.8 °C) (Temporal)   Resp 18   Ht 170.2 cm (67\")   Wt 88 kg (194 lb)   SpO2 97%   BMI 30.38 kg/m²       Physical Exam  Vitals and nursing note reviewed.   Constitutional:       General: He is not in acute distress.     Appearance: Normal appearance. He is not ill-appearing, toxic-appearing or diaphoretic.   HENT:      Head: Normocephalic and atraumatic.      Jaw: There is normal jaw occlusion.      Right Ear: Hearing and external ear normal.      Left Ear: Hearing and external ear normal.      Nose: Nose normal.      Mouth/Throat:      Lips: Pink.   Eyes:      General: Lids are normal. Vision grossly " intact. Gaze aligned appropriately.      Extraocular Movements: Extraocular movements intact.      Conjunctiva/sclera: Conjunctivae normal.      Pupils: Pupils are equal, round, and reactive to light.   Cardiovascular:      Rate and Rhythm: Normal rate and regular rhythm.      Pulses: Normal pulses.           Carotid pulses are 2+ on the right side and 2+ on the left side.       Radial pulses are 2+ on the right side and 2+ on the left side.        Dorsalis pedis pulses are 2+ on the right side and 2+ on the left side.        Posterior tibial pulses are 2+ on the right side and 2+ on the left side.      Heart sounds: Normal heart sounds, S1 normal and S2 normal. No murmur heard.  Pulmonary:      Effort: Pulmonary effort is normal.      Breath sounds: Normal breath sounds and air entry.   Abdominal:      General: Abdomen is flat. Bowel sounds are normal. There is no distension or abdominal bruit.      Palpations: Abdomen is soft.      Tenderness: There is no abdominal tenderness.   Musculoskeletal:         General: Normal range of motion.      Cervical back: Full passive range of motion without pain, normal range of motion and neck supple.      Right lower leg: No edema.      Left lower leg: No edema.   Skin:     General: Skin is warm and dry.      Capillary Refill: Capillary refill takes less than 2 seconds.      Coloration: Skin is not pale.      Findings: No bruising, erythema or rash.   Neurological:      General: No focal deficit present.      Mental Status: He is alert and oriented to person, place, and time. Mental status is at baseline.      GCS: GCS eye subscore is 4. GCS verbal subscore is 5. GCS motor subscore is 6.      Cranial Nerves: Cranial nerves 2-12 are intact. No cranial nerve deficit.      Sensory: Sensation is intact. No sensory deficit.      Motor: Motor function is intact. No weakness.      Coordination: Coordination is intact. Coordination normal.      Gait: Gait is intact. Gait normal.       Deep Tendon Reflexes: Reflexes normal.   Psychiatric:         Attention and Perception: Attention and perception normal.         Mood and Affect: Mood and affect normal.         Speech: Speech normal.         Behavior: Behavior normal. Behavior is cooperative.         Thought Content: Thought content normal.         Cognition and Memory: Cognition and memory normal.         Judgment: Judgment normal.                 Assessment and Plan   Diagnoses and all orders for this visit:    1. Elevated blood pressure reading with diagnosis of hypertension (Primary)  -     lisinopril (PRINIVIL,ZESTRIL) 40 MG tablet; Take 1 tablet by mouth Daily.  Dispense: 30 tablet; Refill: 1  -     cloNIDine (Catapres) 0.1 MG tablet; Take 1 tablet by mouth 2 (Two) Times a Day As Needed for High Blood Pressure.  Dispense: 90 tablet; Refill: 0    2. Left ventricular hypertrophy by electrocardiogram    3. Hypogonadism in male  -     Ambulatory Referral to Urology    4. Class 1 obesity due to excess calories without serious comorbidity with body mass index (BMI) of 30.0 to 30.9 in adult  Assessment & Plan:  Patient's (Body mass index is 30.38 kg/m².) indicates that they are obese (BMI >30) with health conditions that include hypertension . Weight is newly identified. BMI  is above average; BMI management plan is completed. We discussed portion control and increasing exercise.       Elevated BP reading with HTN  -Recommended ER workup/treatment.  Patient advised on risk of high blood pressure reading such as his today(228/132) and at previous office visit which include potential stroke, MI, death, etc.  -Discussed with patient potential etiology which include genetics vs preworkout/caffiene induced vs high testosterone vs anxiety induced due to OV appt.  -Increase lisinopril to 40mg daily.   -Adding clonidine 0.1mg BID PRN for BP greater than 150/90.  -EKG on 10/04/2024 stable. LVH noted.   -Labs unremarkable.   -Advised patient to cutout  preworkout/caffeine use.   -Patient agreed on urology referral to Dr. Reeves for testosterone management.   -Recommended patient to hold testosterone injections until he sees urology.  -Instructed patient to monitor blood pressure at home daily and keep a blood pressure log.  Instructed patient to call my office for blood pressures greater than 150/90.    Hypogonadism in male  -Total testosterone 1,023 on October 5, 2024 labs.  -Free testosterone 288.1 on October 5, 2024 labs.  -PSA on October 5, 2024 normal.  -Currently managed by Dr[facility] Melendez at [facility].   -Patient agreed on urology referral to Dr. Reeves for testosterone management.   -Recommended patient to hold testosterone injections until he sees urology.    -Discussed ER red flags.  -Instructed patient to follow-up with me in 2 weeks for hypertension.    Follow Up   Return in about 2 weeks (around 10/28/2024) for Recheck.    There are no Patient Instructions on file for this visit.

## 2024-10-14 NOTE — ASSESSMENT & PLAN NOTE
Patient's (Body mass index is 30.38 kg/m².) indicates that they are obese (BMI >30) with health conditions that include hypertension . Weight is newly identified. BMI  is above average; BMI management plan is completed. We discussed portion control and increasing exercise.

## 2024-11-05 ENCOUNTER — OFFICE VISIT (OUTPATIENT)
Dept: FAMILY MEDICINE CLINIC | Facility: CLINIC | Age: 54
End: 2024-11-05
Payer: COMMERCIAL

## 2024-11-05 VITALS
OXYGEN SATURATION: 97 % | RESPIRATION RATE: 14 BRPM | HEART RATE: 95 BPM | HEIGHT: 67 IN | DIASTOLIC BLOOD PRESSURE: 148 MMHG | SYSTOLIC BLOOD PRESSURE: 219 MMHG | WEIGHT: 198 LBS | BODY MASS INDEX: 31.08 KG/M2

## 2024-11-05 DIAGNOSIS — I10 UNCONTROLLED HYPERTENSION: ICD-10-CM

## 2024-11-05 DIAGNOSIS — I10 ELEVATED BLOOD PRESSURE READING WITH DIAGNOSIS OF HYPERTENSION: Primary | ICD-10-CM

## 2024-11-05 DIAGNOSIS — Z82.49 FAMILY HISTORY OF CARDIOVASCULAR DISEASE: ICD-10-CM

## 2024-11-05 DIAGNOSIS — R79.89 LOW TESTOSTERONE IN MALE: ICD-10-CM

## 2024-11-05 PROCEDURE — 99214 OFFICE O/P EST MOD 30 MIN: CPT | Performed by: NURSE PRACTITIONER

## 2024-11-05 RX ORDER — AMLODIPINE BESYLATE 5 MG/1
5 TABLET ORAL DAILY
Qty: 30 TABLET | Refills: 1 | Status: SHIPPED | OUTPATIENT
Start: 2024-11-05

## 2024-11-05 NOTE — PROGRESS NOTES
Juan Couch  [id number]  1970  male     11/05/2024      Chief Complaint  Hypertension    History of Present Illness  54-year-old male patient presents today to follow-up on hypertension.    Blood pressure readings at home are the following:  On October 14 158/85  On October 15 153/93  On October 17 168/93  On October 19 153/86  On October 20 156/89  On October 22 172/102  On October 24 184/103  On October 28 165/92    Patient reports he feels anxious right before coming to a doctors office. States otherwise he feels great. Denies headache, lightheadedness, dizziness, blurry vision, tinnitus, cough, shortness of breath, chest pain, palpitations, leg swelling, abdominal pain, NVD, dysuria, rash. States he has not seen urology yet. States he has been taking clonidine but recently stopped taking lisinopril as he thought it has not been working. Denies any side effects.   Hypertension         Review of Systems   Constitutional: Negative.    HENT: Negative.     Eyes: Negative.    Respiratory: Negative.     Cardiovascular: Negative.    Gastrointestinal: Negative.    Endocrine: Negative.    Genitourinary: Negative.    Musculoskeletal: Negative.    Skin: Negative.    Allergic/Immunologic: Negative.    Neurological: Negative.    Hematological: Negative.    Psychiatric/Behavioral: Negative.         Past Medical History:   Diagnosis Date    Fractured shoulder 06/16/2021    right    Hypertension        Past Surgical History:   Procedure Laterality Date    CLAVICLE OPEN REDUCTION INTERNAL FIXATION Right 6/28/2021    Procedure: RIGHT CLAVICLE AND ORIF;  Surgeon: Jimmie Montilla MD;  Location: [facility] MAIN OR;  Service: Orthopedics;  Laterality: Right;    DISTAL BICEPS TENDON REPAIR Right 6/28/2021    Procedure: PECTORALIS MAJOR REPAIR;  Surgeon: Jimmie Montilla MD;  Location: [facility] MAIN OR;  Service: Orthopedics;  Laterality: Right;       Family History   Problem Relation Age of Onset    Hypertension Mother      "Stroke Mother     Hypertension Brother     Hypertension Maternal Uncle     Stroke Maternal Uncle     Hypertension Maternal Grandfather     Stroke Maternal Grandfather        Social History     Socioeconomic History    Marital status:    Tobacco Use    Smoking status: Never    Smokeless tobacco: Current     Types: Snuff   Vaping Use    Vaping status: Every Day    Substances: Flavoring    Devices: Disposable   Substance and Sexual Activity    Alcohol use: Yes     Comment: Rare    Drug use: Never    Sexual activity: Defer        No Known Allergies      Objective   Vital Signs:   BP (!) 219/148 (BP Location: Left arm, Patient Position: Sitting, Cuff Size: Large Adult)   Pulse 95   Resp 14   Ht 170.2 cm (67\")   Wt 89.8 kg (198 lb)   SpO2 97%   BMI 31.01 kg/m²       Physical Exam  Vitals and nursing note reviewed.   Constitutional:       General: He is not in acute distress.     Appearance: Normal appearance. He is not ill-appearing, toxic-appearing or diaphoretic.   HENT:      Head: Normocephalic and atraumatic.      Jaw: There is normal jaw occlusion.      Right Ear: Hearing and external ear normal.      Left Ear: Hearing and external ear normal.      Nose: Nose normal.      Mouth/Throat:      Lips: Pink.   Eyes:      General: Lids are normal. Vision grossly intact. Gaze aligned appropriately.      Extraocular Movements: Extraocular movements intact.      Conjunctiva/sclera: Conjunctivae normal.      Pupils: Pupils are equal, round, and reactive to light.   Cardiovascular:      Rate and Rhythm: Normal rate and regular rhythm.      Pulses: Normal pulses.           Carotid pulses are 2+ on the right side and 2+ on the left side.       Radial pulses are 2+ on the right side and 2+ on the left side.        Dorsalis pedis pulses are 2+ on the right side and 2+ on the left side.        Posterior tibial pulses are 2+ on the right side and 2+ on the left side.      Heart sounds: Normal heart sounds, S1 normal and " S2 normal. No murmur heard.  Pulmonary:      Effort: Pulmonary effort is normal.      Breath sounds: Normal breath sounds and air entry.   Abdominal:      General: Abdomen is flat. Bowel sounds are normal. There is no distension or abdominal bruit.      Palpations: Abdomen is soft.      Tenderness: There is no abdominal tenderness.   Musculoskeletal:         General: Normal range of motion.      Cervical back: Full passive range of motion without pain, normal range of motion and neck supple.      Right lower leg: No edema.      Left lower leg: No edema.   Skin:     General: Skin is warm and dry.      Capillary Refill: Capillary refill takes less than 2 seconds.      Coloration: Skin is not pale.      Findings: No bruising, erythema or rash.   Neurological:      General: No focal deficit present.      Mental Status: He is alert and oriented to person, place, and time. Mental status is at baseline.      GCS: GCS eye subscore is 4. GCS verbal subscore is 5. GCS motor subscore is 6.      Cranial Nerves: Cranial nerves 2-12 are intact. No cranial nerve deficit.      Sensory: Sensation is intact. No sensory deficit.      Motor: Motor function is intact. No weakness.      Coordination: Coordination is intact. Coordination normal.      Gait: Gait is intact. Gait normal.      Deep Tendon Reflexes: Reflexes normal.   Psychiatric:         Attention and Perception: Attention and perception normal.         Mood and Affect: Mood and affect normal.         Speech: Speech normal.         Behavior: Behavior normal. Behavior is cooperative.         Thought Content: Thought content normal.         Cognition and Memory: Cognition and memory normal.         Judgment: Judgment normal.                 Assessment and Plan   Diagnoses and all orders for this visit:    1. Elevated blood pressure reading with diagnosis of hypertension (Primary)  -     amLODIPine (NORVASC) 5 MG tablet; Take 1 tablet by mouth Daily.  Dispense: 30 tablet;  Refill: 1  -     Ambulatory Referral to Cardiology    2. Uncontrolled hypertension  -     amLODIPine (NORVASC) 5 MG tablet; Take 1 tablet by mouth Daily.  Dispense: 30 tablet; Refill: 1  -     Ambulatory Referral to Cardiology    3. Family history of cardiovascular disease  -     Ambulatory Referral to Cardiology    4. Low testosterone in male      Uncontrolled Hypertension  -BP elevated today, uncontrolled. Discussed side effects of severely high blood pressure such as stroke, MI, vision loss, memory loss, etc.   -Recommended patient to go to nearest ER for further eval/treatment due to severely high blood pressure as today in office.   -Patient provided home BP readings as listed above, BP elevated at home but not as elevated as he has been in office. Discussed possible white coat syndrome.   -Due to severely elevated BP readings in office, elevated BP readings at home, and family history of cardiovascular disease referring patient to cardiology for further work up.  -Referral placed to [facility].   -EKG on 10/04/24 sinus rhythm with sinus arrhthymia.   -Patient advised to start back on lisinopril 40mg daily.  -Patient advised to continue clonidine PRN for BP greater than 150/90.  -Starting amlodipine 5mg daily.     Low testosterone in male  -Patient was referred to urology on 10/14/24 due to elevated testosterone levels noted on 10/05/24 labs.   -Patient notified that urology attempted to call him on 10/17/24. Patient provided with urologist Dr. Whitfield office phone # and advised to call today to setup appt.     -Discussed ER red flags.   -Instructed patient to follow up with me in 6 weeks for HTN.    Follow Up   Return in about 6 weeks (around 12/17/2024) for Recheck.    There are no Patient Instructions on file for this visit.

## 2024-11-06 ENCOUNTER — TELEPHONE (OUTPATIENT)
Dept: FAMILY MEDICINE CLINIC | Facility: CLINIC | Age: 54
End: 2024-11-06
Payer: COMMERCIAL

## 2024-11-25 DIAGNOSIS — I10 ELEVATED BLOOD PRESSURE READING WITH DIAGNOSIS OF HYPERTENSION: ICD-10-CM

## 2024-11-25 RX ORDER — CLONIDINE HYDROCHLORIDE 0.1 MG/1
TABLET ORAL
Qty: 90 TABLET | Refills: 0 | Status: SHIPPED | OUTPATIENT
Start: 2024-11-25

## 2024-11-25 NOTE — TELEPHONE ENCOUNTER
Rx Refill Note  Requested Prescriptions     Pending Prescriptions Disp Refills    cloNIDine (CATAPRES) 0.1 MG tablet [Pharmacy Med Name: CLONIDINE 0.1MG TABLETS] 90 tablet 0     Sig: TAKE 1 TABLET BY MOUTH TWICE DAILY AS NEEDED FOR HIGH BLOOD PRESSURE      Last office visit with prescribing clinician: 11/5/2024   Last telemedicine visit with prescribing clinician: Visit date not found   Next office visit with prescribing clinician: 12/16/2024                         Would you like a call back once the refill request has been completed: [] Yes [] No    If the office needs to give you a call back, can they leave a voicemail: [] Yes [] No    Rebeka Hopkins MA  11/25/24, 10:45 EST

## 2024-12-03 DIAGNOSIS — I10 ELEVATED BLOOD PRESSURE READING WITH DIAGNOSIS OF HYPERTENSION: ICD-10-CM

## 2024-12-03 RX ORDER — LISINOPRIL 40 MG/1
40 TABLET ORAL DAILY
Qty: 30 TABLET | Refills: 1 | Status: SHIPPED | OUTPATIENT
Start: 2024-12-03

## 2024-12-03 NOTE — TELEPHONE ENCOUNTER
Rx Refill Note  Requested Prescriptions     Pending Prescriptions Disp Refills    lisinopril (PRINIVIL,ZESTRIL) 40 MG tablet [Pharmacy Med Name: LISINOPRIL 40MG TABLETS] 30 tablet 1     Sig: TAKE 1 TABLET BY MOUTH DAILY      Last office visit with prescribing clinician: 11/5/2024   Last telemedicine visit with prescribing clinician: Visit date not found   Next office visit with prescribing clinician: 12/16/2024                         Would you like a call back once the refill request has been completed: [] Yes [] No    If the office needs to give you a call back, can they leave a voicemail: [] Yes [] No    Kelsey Woods MA  12/03/24, 11:05 EST

## 2024-12-07 DIAGNOSIS — I10 UNCONTROLLED HYPERTENSION: ICD-10-CM

## 2024-12-07 DIAGNOSIS — I10 ELEVATED BLOOD PRESSURE READING WITH DIAGNOSIS OF HYPERTENSION: ICD-10-CM

## 2024-12-09 RX ORDER — AMLODIPINE BESYLATE 5 MG/1
5 TABLET ORAL DAILY
Qty: 30 TABLET | Refills: 1 | OUTPATIENT
Start: 2024-12-09

## 2024-12-09 NOTE — TELEPHONE ENCOUNTER
Refill request for Amlodipine denied. Medication was sent in on 11.05.2024 with 1 additional refill. Patient is scheduled to see Cardio on 12.16.2024 as well

## 2024-12-16 ENCOUNTER — OFFICE VISIT (OUTPATIENT)
Dept: FAMILY MEDICINE CLINIC | Facility: CLINIC | Age: 54
End: 2024-12-16
Payer: COMMERCIAL

## 2024-12-16 ENCOUNTER — CONSULT (OUTPATIENT)
Dept: CARDIOLOGY | Facility: CLINIC | Age: 54
End: 2024-12-16
Payer: COMMERCIAL

## 2024-12-16 VITALS
OXYGEN SATURATION: 97 % | HEIGHT: 67 IN | BODY MASS INDEX: 29.98 KG/M2 | DIASTOLIC BLOOD PRESSURE: 110 MMHG | HEART RATE: 92 BPM | WEIGHT: 191 LBS | RESPIRATION RATE: 16 BRPM | TEMPERATURE: 98.4 F | SYSTOLIC BLOOD PRESSURE: 166 MMHG

## 2024-12-16 VITALS
BODY MASS INDEX: 30.13 KG/M2 | OXYGEN SATURATION: 98 % | DIASTOLIC BLOOD PRESSURE: 110 MMHG | SYSTOLIC BLOOD PRESSURE: 190 MMHG | HEIGHT: 67 IN | HEART RATE: 103 BPM | WEIGHT: 192 LBS

## 2024-12-16 DIAGNOSIS — I1A.0 RESISTANT HYPERTENSION: Primary | ICD-10-CM

## 2024-12-16 DIAGNOSIS — I10 ELEVATED BLOOD PRESSURE READING WITH DIAGNOSIS OF HYPERTENSION: Primary | ICD-10-CM

## 2024-12-16 DIAGNOSIS — Z76.0 MEDICATION REFILL: ICD-10-CM

## 2024-12-16 DIAGNOSIS — Z13.9 SCREENING DUE: ICD-10-CM

## 2024-12-16 DIAGNOSIS — E29.1 HYPOGONADISM IN MALE: ICD-10-CM

## 2024-12-16 DIAGNOSIS — N52.9 ERECTILE DYSFUNCTION, UNSPECIFIED ERECTILE DYSFUNCTION TYPE: ICD-10-CM

## 2024-12-16 PROCEDURE — 99214 OFFICE O/P EST MOD 30 MIN: CPT | Performed by: NURSE PRACTITIONER

## 2024-12-16 PROCEDURE — 93000 ELECTROCARDIOGRAM COMPLETE: CPT | Performed by: INTERNAL MEDICINE

## 2024-12-16 PROCEDURE — 99204 OFFICE O/P NEW MOD 45 MIN: CPT | Performed by: INTERNAL MEDICINE

## 2024-12-16 RX ORDER — AZILSARTAN KAMEDOXOMIL AND CHLORTHALIDONE 40; 25 MG/1; MG/1
1 TABLET ORAL DAILY
Qty: 90 TABLET | Refills: 3 | Status: SHIPPED | OUTPATIENT
Start: 2024-12-16

## 2024-12-16 RX ORDER — AMLODIPINE BESYLATE 10 MG/1
10 TABLET ORAL DAILY
Qty: 90 TABLET | Refills: 3 | Status: SHIPPED | OUTPATIENT
Start: 2024-12-16

## 2024-12-16 RX ORDER — SILDENAFIL 25 MG/1
50 TABLET, FILM COATED ORAL DAILY PRN
Qty: 12 TABLET | Refills: 2 | Status: SHIPPED | OUTPATIENT
Start: 2024-12-16

## 2024-12-16 NOTE — PROGRESS NOTES
Juan Couch  [id number]  1970  male     12/16/2024      Chief Complaint  Hypertension (Follow up. Patient seen Cardio today)    History of Present Illness  54-year-old male patient presents today with his wife to follow-up on hypertension.  States he had his initial appointment with cardiology earlier today.  [facility] cardiology changed up some of his blood pressure medications.  [facility] cardiology ordered echocardiogram and renal ultrasound as well as some labs.  Denies headache, lightheadedness, dizziness, numbness, tingling, cough, shortness of breath, chest pain, palpitations, leg swelling, abdominal pain, NVD, dysuria, rash.  Requesting refill on sildenafil.  Patient states he has not seen urology yet for hypogonadism.  Hypertension                Review of Systems   Constitutional: Negative.    HENT: Negative.     Eyes: Negative.    Respiratory: Negative.     Cardiovascular: Negative.    Gastrointestinal: Negative.    Endocrine: Negative.    Genitourinary: Negative.    Musculoskeletal: Negative.    Skin: Negative.    Allergic/Immunologic: Negative.    Neurological: Negative.    Hematological: Negative.    Psychiatric/Behavioral: Negative.         Past Medical History:   Diagnosis Date    Fractured shoulder 06/16/2021    right    Hypertension        Past Surgical History:   Procedure Laterality Date    CLAVICLE OPEN REDUCTION INTERNAL FIXATION Right 6/28/2021    Procedure: RIGHT CLAVICLE AND ORIF;  Surgeon: Jimmie Montilla MD;  Location: [facility] OR;  Service: Orthopedics;  Laterality: Right;    DISTAL BICEPS TENDON REPAIR Right 6/28/2021    Procedure: PECTORALIS MAJOR REPAIR;  Surgeon: Jimmie Montilla MD;  Location: [facility] MAIN OR;  Service: Orthopedics;  Laterality: Right;       Family History   Problem Relation Age of Onset    Hypertension Mother     Stroke Mother     Hypertension Brother     Hypertension Maternal Uncle     Stroke Maternal Uncle     Hypertension Maternal Grandfather      "Stroke Maternal Grandfather        Social History     Socioeconomic History    Marital status:    Tobacco Use    Smoking status: Never     Passive exposure: Past    Smokeless tobacco: Current     Types: Snuff   Vaping Use    Vaping status: Every Day    Substances: Flavoring    Devices: Disposable    Passive vaping exposure: Yes   Substance and Sexual Activity    Alcohol use: Yes     Comment: Rare    Drug use: Never    Sexual activity: Defer        No Known Allergies      Objective   Vital Signs:   BP (!) 166/110 (BP Location: Left arm, Patient Position: Sitting, Cuff Size: Adult)   Pulse 92   Temp 98.4 °F (36.9 °C) (Temporal)   Resp 16   Ht 170.2 cm (67\")   Wt 86.6 kg (191 lb)   SpO2 97%   BMI 29.91 kg/m²       Physical Exam  Vitals and nursing note reviewed.   Constitutional:       General: He is not in acute distress.     Appearance: Normal appearance. He is not ill-appearing, toxic-appearing or diaphoretic.   HENT:      Head: Normocephalic and atraumatic.      Jaw: There is normal jaw occlusion.      Right Ear: Hearing and external ear normal.      Left Ear: Hearing and external ear normal.      Nose: Nose normal.      Mouth/Throat:      Lips: Pink.   Eyes:      General: Lids are normal. Vision grossly intact. Gaze aligned appropriately.      Extraocular Movements: Extraocular movements intact.      Conjunctiva/sclera: Conjunctivae normal.      Pupils: Pupils are equal, round, and reactive to light.   Cardiovascular:      Rate and Rhythm: Normal rate and regular rhythm.      Pulses: Normal pulses.           Carotid pulses are 2+ on the right side and 2+ on the left side.       Radial pulses are 2+ on the right side and 2+ on the left side.        Dorsalis pedis pulses are 2+ on the right side and 2+ on the left side.        Posterior tibial pulses are 2+ on the right side and 2+ on the left side.      Heart sounds: Normal heart sounds, S1 normal and S2 normal. No murmur heard.  Pulmonary:      " Effort: Pulmonary effort is normal.      Breath sounds: Normal breath sounds and air entry.   Abdominal:      General: Abdomen is flat. Bowel sounds are normal. There is no distension or abdominal bruit.      Palpations: Abdomen is soft.      Tenderness: There is no abdominal tenderness.   Musculoskeletal:         General: Normal range of motion.      Cervical back: Full passive range of motion without pain, normal range of motion and neck supple.      Right lower leg: No edema.      Left lower leg: No edema.   Skin:     General: Skin is warm and dry.      Capillary Refill: Capillary refill takes less than 2 seconds.      Coloration: Skin is not pale.      Findings: No bruising, erythema or rash.   Neurological:      General: No focal deficit present.      Mental Status: He is alert and oriented to person, place, and time. Mental status is at baseline.      GCS: GCS eye subscore is 4. GCS verbal subscore is 5. GCS motor subscore is 6.      Cranial Nerves: Cranial nerves 2-12 are intact. No cranial nerve deficit.      Sensory: Sensation is intact. No sensory deficit.      Motor: Motor function is intact. No weakness.      Coordination: Coordination is intact. Coordination normal.      Gait: Gait is intact. Gait normal.      Deep Tendon Reflexes: Reflexes normal.   Psychiatric:         Attention and Perception: Attention and perception normal.         Mood and Affect: Mood and affect normal.         Speech: Speech normal.         Behavior: Behavior normal. Behavior is cooperative.         Thought Content: Thought content normal.         Cognition and Memory: Cognition and memory normal.         Judgment: Judgment normal.                 Assessment and Plan   Diagnoses and all orders for this visit:    1. Elevated blood pressure reading with diagnosis of hypertension (Primary)    2. Erectile dysfunction, unspecified erectile dysfunction type  -     sildenafil (VIAGRA) 25 MG tablet; Take 2 tablets by mouth Daily As  Needed for Erectile Dysfunction.  Dispense: 12 tablet; Refill: 2    3. Medication refill  -     sildenafil (VIAGRA) 25 MG tablet; Take 2 tablets by mouth Daily As Needed for Erectile Dysfunction.  Dispense: 12 tablet; Refill: 2    4. Hypogonadism in male    Elevated blood-pressure reading with diagnosis of hypertension  -Patient had initial appointment with cardiology earlier today.  -Cardiology ordered 2D echocardiogram, renal artery duplex, labs, heart and vascular screenings.  -Cardiology DC'd lisinopril and initiated Edarbychlor as well as increased amlodipine.  Patient is to follow-up with cardiology in 2 months.    Erectile dysfunction  -Stable.  Continue sildenafil as needed.  Refill sent as requested.    Hypogonadism in male  -On testosterone.  -Was referred to urologist Dr. Reeves on October 14, 2024.  Patient has yet to see urology.  -Advised patient to call urology to set up appointment.    -Discussed ER red flags.  -Instructed patient to follow-up with me in 3 months for annual physical exam.    Follow Up   Return in about 3 months (around 3/16/2025) for Annual physical.    There are no Patient Instructions on file for this visit.

## 2024-12-16 NOTE — PROGRESS NOTES
Cardiology Office Consultation      Encounter Date:  2024    PATIENT IDENTIFICATION    Name: Juan Couch  Age: 54 y.o. Sex: male : 1970  MRN: [id number]    Reason For Consultation:  Hypertension    History of Present Illness:  Dear Tano Palomares APRN    I had the pleasure of seeing Juan Couch today. As you are well aware, this is a 54 y.o. male with no established history of ischemic heart disease.  He does have a history of hypertension and erectile dysfunction.    He has had some issues with blood pressure for about 10 years. He was having a physical and his blood pressure was so high, that he was sent to hospital.     Recently he started on testosterone replacement about a year ago. He reports that his home readings are generally in the 150s to 160s.     He works out and tries to eat cleanly.     No chest pain. No shortness of breath. No palps.    Has a family history of HTN and strokes.        Assessment & Plan    Impressions:  Hypertension, resistant  Erectile dysfunction  Hypotestosteronism  Abnormal EKG with LVH    Recommendations:  Check 2D echocardiogram  Renal artery duplex  Stop lisinopril  Edarbi chlor 40/25 mg daily  Increase amlodipine to 10 mg daily  BMP 1 week after starting Edarbi chlor  Heart and vascular screening  Follow-up 2 months      Diagnoses and all orders for this visit:    1. Resistant hypertension (Primary)  -     Vascular Screening (Bundle) CAR; Future  -     CT Cardiac Calcium Score Without Dye; Future  -     Adult Transthoracic Echo Complete W/ Cont if Necessary Per Protocol; Future  -     Duplex Renal Artery - Bilateral Complete CAR; Future  -     Basic Metabolic Panel; Future  -     ECG 12 Lead    2. Screening due  -     Vascular Screening (Bundle) CAR; Future  -     CT Cardiac Calcium Score Without Dye; Future  -     Adult Transthoracic Echo Complete W/ Cont if Necessary Per Protocol; Future  -     Duplex Renal Artery - Bilateral Complete CAR; Future  -    "  Basic Metabolic Panel; Future  -     ECG 12 Lead    Other orders  -     amLODIPine (NORVASC) 10 MG tablet; Take 1 tablet by mouth Daily.  Dispense: 90 tablet; Refill: 3  -     Azilsartan-Chlorthalidone (Edarbyclor) 40-25 MG tablet; Take 1 tablet by mouth Daily. (Patient not taking: Reported on 12/16/2024)  Dispense: 90 tablet; Refill: 3         Objective:    Vitals:  Vitals:    12/16/24 1114   BP: (!) 190/110   BP Location: Left arm   Patient Position: Sitting   Cuff Size: Adult   Pulse: 103   SpO2: 98%   Weight: 87.1 kg (192 lb)   Height: 170.2 cm (67\")     Body mass index is 30.07 kg/m².    Physical Exam:    General: Alert, cooperative, no distress, appears stated age  Head:  Normocephalic, atraumatic, mucous membranes moist  Eyes:  Conjunctiva/corneas clear, EOM's intact     Neck:  Supple,  no bruit    Lungs: Clear to auscultation bilaterally, no wheezes rhonchi rales are noted  Chest wall: No tenderness  Heart::  Regular rate and rhythm, S1 and S2 normal, 1/6 holosystolic murmur.  No rub or gallop  Abdomen: Soft, non-tender, nondistended bowel sounds active  Extremities: No cyanosis, clubbing, or edema  Pulses: 2+ and symmetric all extremities  Skin:  No rashes or lesions  Neuro/psych: A&O x3. CN II through XII are grossly intact with appropriate affect    History of Present Illness      Allergies:  No Known Allergies    Medication Review:     Current Outpatient Medications:     acetaminophen (TYLENOL) 325 MG tablet, Take 2 tablets by mouth Every 6 (Six) Hours As Needed for Mild Pain., Disp: , Rfl:     cloNIDine (CATAPRES) 0.1 MG tablet, TAKE 1 TABLET BY MOUTH TWICE DAILY AS NEEDED FOR HIGH BLOOD PRESSURE, Disp: 90 tablet, Rfl: 0    Testosterone Cypionate (DEPOTESTOTERONE CYPIONATE) 200 MG/ML injection, INJECT 0.5 MLS INTO THE MUSCLE ONCE WEEKLY, Disp: , Rfl:     amLODIPine (NORVASC) 10 MG tablet, Take 1 tablet by mouth Daily., Disp: 90 tablet, Rfl: 3    Azilsartan-Chlorthalidone (Edarbyclor) 40-25 MG tablet, " Take 1 tablet by mouth Daily. (Patient not taking: Reported on 12/16/2024), Disp: 90 tablet, Rfl: 3    sildenafil (VIAGRA) 25 MG tablet, Take 2 tablets by mouth Daily As Needed for Erectile Dysfunction., Disp: 12 tablet, Rfl: 2    Family History:  Family History   Problem Relation Age of Onset    Hypertension Mother     Stroke Mother     Hypertension Brother     Hypertension Maternal Uncle     Stroke Maternal Uncle     Hypertension Maternal Grandfather     Stroke Maternal Grandfather        Past Medical History:  Past Medical History:   Diagnosis Date    Fractured shoulder 06/16/2021    right    Hypertension        Past surgical History:  Past Surgical History:   Procedure Laterality Date    CLAVICLE OPEN REDUCTION INTERNAL FIXATION Right 6/28/2021    Procedure: RIGHT CLAVICLE AND ORIF;  Surgeon: Jimmie Montilla MD;  Location: [facility];  Service: Orthopedics;  Laterality: Right;    DISTAL BICEPS TENDON REPAIR Right 6/28/2021    Procedure: PECTORALIS MAJOR REPAIR;  Surgeon: Jimmie Montilla MD;  Location: [facility];  Service: Orthopedics;  Laterality: Right;       Social History:  Social History     Socioeconomic History    Marital status:    Tobacco Use    Smoking status: Never     Passive exposure: Past    Smokeless tobacco: Current     Types: Snuff   Vaping Use    Vaping status: Every Day    Substances: Flavoring    Devices: Disposable    Passive vaping exposure: Yes   Substance and Sexual Activity    Alcohol use: Yes     Comment: Rare    Drug use: Never    Sexual activity: Defer       Review of Systems:  The following systems were reviewed as they relate to the cardiovascular system: Constitutional, Eyes, ENT, Cardiovascular, Respiratory, Gastrointestinal, Integumentary, Neurological, Psychiatric, Hematologic, Endocrine, Musculoskeletal, and Genitourinary. The pertinent cardiovascular findings are reported above with all other cardiovascular points within those systems being  "negative.    Diagnostic Study Review:     Current Electrocardiogram:    ECG 12 Lead    Date/Time: 12/16/2024 10:43 PM  Performed by: Madhu Dueñas DO    Authorized by: Madhu Dueñas DO  Comparison: not compared with previous ECG   Previous ECG: no previous ECG available  Comments: Normal sinus rhythm with a ventricular rate of 80 bpm.  LVH.  Normal QT and QTc intervals.  Normal QRS axis          Laboratory Data:  Lab Results   Component Value Date    GLUCOSE 91 06/23/2021    BUN 18 06/23/2021    CREATININE 1.06 10/16/2023    EGFRIFNONA 116 06/23/2021    BCR 25.0 06/23/2021    K 4.4 06/23/2021    CO2 29.5 (H) 06/23/2021    CALCIUM 9.7 06/23/2021    ALBUMIN 5.00 06/23/2021     Lab Results   Component Value Date    GLUCOSE 91 06/23/2021    CALCIUM 9.7 06/23/2021     06/23/2021    K 4.4 06/23/2021    CO2 29.5 (H) 06/23/2021    CL 98 06/23/2021    BUN 18 06/23/2021    CREATININE 1.06 10/16/2023    EGFRIFNONA 116 06/23/2021    BCR 25.0 06/23/2021    ANIONGAP 11.5 06/23/2021     Lab Results   Component Value Date    WBC 5.22 10/16/2023    HGB 15.5 10/16/2023    HCT 44.5 10/16/2023    MCV 88 10/16/2023     10/16/2023     No results found for: \"CHOL\", \"CHLPL\", \"TRIG\", \"HDL\", \"LDL\", \"LDLDIRECT\"  Lab Results   Component Value Date    HGBA1C 4.9 10/16/2023     Lab Results   Component Value Date    INR 0.98 06/23/2021    PROTIME 10.9 06/23/2021       Most Recent Echo:       Most Recent Stress Test:       Most Recent Cardiac Catheterization:   No results found for this or any previous visit.       NOTE: The following portions of the patient's note were reviewed, confirmed and/or updated this visit as appropriate: History of present illness/Interval history, physical examination, assessment & plan, allergies, current medications, past family history, past medical history, past social history, past surgical history and problem list.  "

## 2024-12-16 NOTE — PATIENT INSTRUCTIONS
2-D echo  Renal artery duplex  Stop lisinopril  Edarbychlor 40/25 mg daily  Increase amlodipine to 10 mg daily  BMP 1 week after starting new regimen  Heart and vascular screening  Follow-up 2 months

## 2024-12-30 ENCOUNTER — OFFICE VISIT (OUTPATIENT)
Dept: FAMILY MEDICINE CLINIC | Facility: CLINIC | Age: 54
End: 2024-12-30
Payer: COMMERCIAL

## 2024-12-30 VITALS
RESPIRATION RATE: 18 BRPM | DIASTOLIC BLOOD PRESSURE: 90 MMHG | SYSTOLIC BLOOD PRESSURE: 155 MMHG | WEIGHT: 191 LBS | HEIGHT: 67 IN | HEART RATE: 102 BPM | OXYGEN SATURATION: 98 % | TEMPERATURE: 97.6 F | BODY MASS INDEX: 29.98 KG/M2

## 2024-12-30 DIAGNOSIS — R05.1 ACUTE COUGH: Primary | ICD-10-CM

## 2024-12-30 DIAGNOSIS — J11.1 INFLUENZA: ICD-10-CM

## 2024-12-30 LAB
EXPIRATION DATE: ABNORMAL
FLUAV AG UPPER RESP QL IA.RAPID: NOT DETECTED
FLUBV AG UPPER RESP QL IA.RAPID: DETECTED
INTERNAL CONTROL: ABNORMAL
Lab: ABNORMAL
SARS-COV-2 AG UPPER RESP QL IA.RAPID: NOT DETECTED

## 2024-12-30 PROCEDURE — 87428 SARSCOV & INF VIR A&B AG IA: CPT

## 2024-12-30 PROCEDURE — 99213 OFFICE O/P EST LOW 20 MIN: CPT

## 2024-12-30 RX ORDER — OSELTAMIVIR PHOSPHATE 75 MG/1
75 CAPSULE ORAL 2 TIMES DAILY
Qty: 10 CAPSULE | Refills: 0 | Status: SHIPPED | OUTPATIENT
Start: 2024-12-30 | End: 2025-01-04

## 2024-12-30 NOTE — PROGRESS NOTES
"Chief Complaint  Flu Symptoms    Subjective    History of Present Illness {CC  Problem List  Visit  Diagnosis   Encounters  Notes  Medications  Labs  Result Review Imaging  Media :23}     Juan Couch presents to [facility] for Flu Symptoms.      History of Present Illness     History of Present Illness  The patient is a 54-year-old individual who presents with a chief complaint of cough.    The patient says approximately 3 weeks ago he had malaise, cough, sinus congestion.  He says those symptoms seem to nearly resolve but 3 days ago he got onset of productive cough with green sputum, nasal discharge, body aches, fatigue, temperature increased to 101F.  He reports no known sick contact. No SOB. No n/v/d.  No chest pain or palpitations.    Patient has had some recent blood pressure medication changes and notes that he has been doing better.  He said blood pressure at home has been 1 20-1 30 over 80s.  155/90 in the office today.          Objective     Vital Signs:   /90 (BP Location: Right arm, Patient Position: Sitting)   Pulse 102   Temp 97.6 °F (36.4 °C) (Infrared)   Resp 18   Ht 170.2 cm (67\")   Wt 86.6 kg (191 lb)   SpO2 98%   BMI 29.91 kg/m²   Current Outpatient Medications on File Prior to Visit   Medication Sig Dispense Refill    acetaminophen (TYLENOL) 325 MG tablet Take 2 tablets by mouth Every 6 (Six) Hours As Needed for Mild Pain.      amLODIPine (NORVASC) 10 MG tablet Take 1 tablet by mouth Daily. 90 tablet 3    Azilsartan-Chlorthalidone (Edarbyclor) 40-25 MG tablet Take 1 tablet by mouth Daily. (Patient not taking: Reported on 12/16/2024) 90 tablet 3    cloNIDine (CATAPRES) 0.1 MG tablet TAKE 1 TABLET BY MOUTH TWICE DAILY AS NEEDED FOR HIGH BLOOD PRESSURE 90 tablet 0    sildenafil (VIAGRA) 25 MG tablet Take 2 tablets by mouth Daily As Needed for Erectile Dysfunction. 12 tablet 2    Testosterone Cypionate (DEPOTESTOTERONE CYPIONATE) 200 MG/ML " injection INJECT 0.5 MLS INTO THE MUSCLE ONCE WEEKLY       No current facility-administered medications on file prior to visit.        Past Medical History:   Diagnosis Date    Fractured shoulder 06/16/2021    right    Hypertension       Past Surgical History:   Procedure Laterality Date    CLAVICLE OPEN REDUCTION INTERNAL FIXATION Right 6/28/2021    Procedure: RIGHT CLAVICLE AND ORIF;  Surgeon: Jimmie Montilla MD;  Location: [facility];  Service: Orthopedics;  Laterality: Right;    DISTAL BICEPS TENDON REPAIR Right 6/28/2021    Procedure: PECTORALIS MAJOR REPAIR;  Surgeon: Jimmie Montilla MD;  Location: [facility] OR;  Service: Orthopedics;  Laterality: Right;      Family History   Problem Relation Age of Onset    Hypertension Mother     Stroke Mother     Hypertension Brother     Hypertension Maternal Uncle     Stroke Maternal Uncle     Hypertension Maternal Grandfather     Stroke Maternal Grandfather       Social History     Socioeconomic History    Marital status:    Tobacco Use    Smoking status: Never     Passive exposure: Past    Smokeless tobacco: Current     Types: Snuff   Vaping Use    Vaping status: Every Day    Substances: Flavoring    Devices: Disposable    Passive vaping exposure: Yes   Substance and Sexual Activity    Alcohol use: Yes     Comment: Rare    Drug use: Never    Sexual activity: Defer         Office Visit on 12/30/2024   Component Date Value Ref Range Status    SARS Antigen 12/30/2024 Not Detected  Not Detected, Presumptive Negative Final    Influenza A Antigen EDIE 12/30/2024 Not Detected  Not Detected Final    Influenza B Antigen EDIE 12/30/2024 Detected (A)  Not Detected Final    Internal Control 12/30/2024 Passed  Passed Final    Lot Number 12/30/2024 [id number]   Final    Expiration Date 12/30/2024 10/23/2025   Final         Physical Exam  Constitutional:       Appearance: Normal appearance.   HENT:      Head: Normocephalic and atraumatic.      Right Ear: Tympanic  membrane and ear canal normal.      Left Ear: Tympanic membrane and ear canal normal.      Nose: Congestion present.      Mouth/Throat:      Pharynx: Posterior oropharyngeal erythema present. No oropharyngeal exudate.   Eyes:      General: No scleral icterus.     Extraocular Movements: Extraocular movements intact.   Cardiovascular:      Rate and Rhythm: Normal rate and regular rhythm.      Pulses: Normal pulses.      Heart sounds: Normal heart sounds. No murmur heard.     No friction rub. No gallop.   Pulmonary:      Effort: Pulmonary effort is normal.      Breath sounds: No wheezing, rhonchi or rales.   Abdominal:      General: Abdomen is flat. Bowel sounds are normal.      Palpations: Abdomen is soft.      Tenderness: There is no abdominal tenderness. There is no right CVA tenderness or left CVA tenderness.   Musculoskeletal:      Cervical back: Neck supple.   Skin:     General: Skin is warm and dry.   Neurological:      Mental Status: He is alert. Mental status is at baseline.      Coordination: Coordination normal.      Gait: Gait normal.   Psychiatric:         Mood and Affect: Mood normal.         Behavior: Behavior normal.         Thought Content: Thought content normal.         Judgment: Judgment normal.          Result Review  Data Reviewed:{ Labs  Result Review  Imaging  Med Tab  Media :23}   I have reviewed this patient's chart.  I have reviewed previous labs, previous imaging, previous medications, and previous encounters with notes that were available in this patient's chart.               Assessment and Plan {CC Problem List  Visit Diagnosis  ROS  Review (Popup)  [facility] Maintenance  Quality  BestPractice  Medications  SmartSets  SnapShot Encounters  Media :23}      Acute cough    Orders:    POCT SARS-CoV-2 Antigen EDIE + Flu    Influenza    Orders:    oseltamivir (Tamiflu) 75 MG capsule; Take 1 capsule by mouth 2 (Two) Times a Day for 5 days.           -Positive for influenza and  negative for COVID on testing today.  Discussed risk and benefits of Tamiflu and he would like prescription for this.  Recommended Tylenol for fever reduction and relief of minor pain and headache.  Recommend holding on NSAID at this time due to recent kidney function. Discussed general measures and symptomatic treatment. Hand hygiene and isolation period recommendations. Increased hydration, healthy diety, and rest. OTC medications, saline nasal spray, humidifier, and lozenges which could help for cough, congestion, sore throat.    -Of note, patient does report that his blood pressure has been improving at home.  155/90 today in the office.  He does have follow-up with cardiology and  primary care in this office as well  -ER red flags discussed with patient including risk versus benefit and education provided.  -Follow-up with me      Follow Up {Instructions Charge Capture  Follow-up Communications :23}     Patient was given instructions and counseling regarding his condition or for health maintenance advice. Please see specific information pulled into the AVS (placed there by myself) if appropriate.    No follow-ups on file.      Judit Schafer PA-C      Patient or patient representative verbalized consent for the use of Ambient Listening during the visit with  Judit Schafer PA-C for chart documentation. 12/30/2024  15:38 EST

## 2025-01-08 ENCOUNTER — HOSPITAL ENCOUNTER (OUTPATIENT)
Dept: CARDIOLOGY | Facility: HOSPITAL | Age: 55
Discharge: HOME | End: 2025-01-08
Admitting: INTERNAL MEDICINE
Payer: COMMERCIAL

## 2025-01-08 VITALS
WEIGHT: 191 LBS | SYSTOLIC BLOOD PRESSURE: 201 MMHG | HEIGHT: 67 IN | DIASTOLIC BLOOD PRESSURE: 108 MMHG | BODY MASS INDEX: 29.98 KG/M2

## 2025-01-08 DIAGNOSIS — Z13.9 SCREENING DUE: ICD-10-CM

## 2025-01-08 DIAGNOSIS — I1A.0 RESISTANT HYPERTENSION: ICD-10-CM

## 2025-01-08 PROCEDURE — 93356 MYOCRD STRAIN IMG SPCKL TRCK: CPT | Performed by: INTERNAL MEDICINE

## 2025-01-08 PROCEDURE — 93306 TTE W/DOPPLER COMPLETE: CPT | Performed by: INTERNAL MEDICINE

## 2025-01-08 PROCEDURE — 93356 MYOCRD STRAIN IMG SPCKL TRCK: CPT

## 2025-01-08 PROCEDURE — 93306 TTE W/DOPPLER COMPLETE: CPT

## 2025-01-10 LAB
AV MEAN PRESS GRAD SYS DOP V1V2: 6.5 MMHG
AV VMAX SYS DOP: 194.3 CM/SEC
BH CV ECHO LEFT VENTRICLE GLOBAL LONGITUDINAL STRAIN: -23.1 %
BH CV ECHO MEAS - AO MAX PG: 15.1 MMHG
BH CV ECHO MEAS - AO ROOT DIAM: 3.6 CM
BH CV ECHO MEAS - AO V2 VTI: 30.4 CM
BH CV ECHO MEAS - AVA(I,D): 2.47 CM2
BH CV ECHO MEAS - EDV(CUBED): 108.3 ML
BH CV ECHO MEAS - EDV(MOD-SP2): 104.9 ML
BH CV ECHO MEAS - EDV(MOD-SP4): 118.9 ML
BH CV ECHO MEAS - EF(MOD-SP2): 70.2 %
BH CV ECHO MEAS - EF(MOD-SP4): 71.3 %
BH CV ECHO MEAS - ESV(CUBED): 28.6 ML
BH CV ECHO MEAS - ESV(MOD-SP2): 31.3 ML
BH CV ECHO MEAS - ESV(MOD-SP4): 34.1 ML
BH CV ECHO MEAS - FS: 35.9 %
BH CV ECHO MEAS - IVS/LVPW: 0.98 CM
BH CV ECHO MEAS - IVSD: 1.19 CM
BH CV ECHO MEAS - LA DIMENSION: 4.4 CM
BH CV ECHO MEAS - LV DIASTOLIC VOL/BSA (35-75): 60 CM2
BH CV ECHO MEAS - LV MASS(C)D: 217.1 GRAMS
BH CV ECHO MEAS - LV MAX PG: 5.7 MMHG
BH CV ECHO MEAS - LV MEAN PG: 2.45 MMHG
BH CV ECHO MEAS - LV SYSTOLIC VOL/BSA (12-30): 17.2 CM2
BH CV ECHO MEAS - LV V1 MAX: 119.5 CM/SEC
BH CV ECHO MEAS - LV V1 VTI: 20.9 CM
BH CV ECHO MEAS - LVIDD: 4.8 CM
BH CV ECHO MEAS - LVIDS: 3.1 CM
BH CV ECHO MEAS - LVOT AREA: 3.6 CM2
BH CV ECHO MEAS - LVOT DIAM: 2.14 CM
BH CV ECHO MEAS - LVPWD: 1.21 CM
BH CV ECHO MEAS - MV A DUR: 0.12 SEC
BH CV ECHO MEAS - MV A MAX VEL: 100 CM/SEC
BH CV ECHO MEAS - MV DEC SLOPE: 534.6 CM/SEC2
BH CV ECHO MEAS - MV DEC TIME: 0.15 SEC
BH CV ECHO MEAS - MV E MAX VEL: 79.9 CM/SEC
BH CV ECHO MEAS - MV E/A: 0.8
BH CV ECHO MEAS - MV MAX PG: 5.4 MMHG
BH CV ECHO MEAS - MV MEAN PG: 3.2 MMHG
BH CV ECHO MEAS - MV V2 VTI: 21.9 CM
BH CV ECHO MEAS - MVA(VTI): 3.4 CM2
BH CV ECHO MEAS - PA ACC TIME: 0.08 SEC
BH CV ECHO MEAS - PA V2 MAX: 191.1 CM/SEC
BH CV ECHO MEAS - PULM A REVS DUR: 0.11 SEC
BH CV ECHO MEAS - PULM A REVS VEL: 49.7 CM/SEC
BH CV ECHO MEAS - PULM DIAS VEL: 81.9 CM/SEC
BH CV ECHO MEAS - PULM S/D: 0.74
BH CV ECHO MEAS - PULM SYS VEL: 60.8 CM/SEC
BH CV ECHO MEAS - RAP SYSTOLE: 3 MMHG
BH CV ECHO MEAS - RV MAX PG: 6.7 MMHG
BH CV ECHO MEAS - RV V1 MAX: 129 CM/SEC
BH CV ECHO MEAS - RV V1 VTI: 20.3 CM
BH CV ECHO MEAS - RVSP: 29.3 MMHG
BH CV ECHO MEAS - SV(LVOT): 75.1 ML
BH CV ECHO MEAS - SV(MOD-SP2): 73.6 ML
BH CV ECHO MEAS - SV(MOD-SP4): 84.8 ML
BH CV ECHO MEAS - SVI(LVOT): 37.9 ML/M2
BH CV ECHO MEAS - SVI(MOD-SP2): 37.1 ML/M2
BH CV ECHO MEAS - SVI(MOD-SP4): 42.8 ML/M2
BH CV ECHO MEAS - TR MAX PG: 26.3 MMHG
BH CV ECHO MEAS - TR MAX VEL: 256.1 CM/SEC
LV EF BIPLANE MOD: 71 %

## 2025-02-24 ENCOUNTER — OFFICE VISIT (OUTPATIENT)
Dept: CARDIOLOGY | Facility: CLINIC | Age: 55
End: 2025-02-24
Payer: COMMERCIAL

## 2025-02-24 VITALS
SYSTOLIC BLOOD PRESSURE: 171 MMHG | WEIGHT: 192 LBS | HEIGHT: 67 IN | HEART RATE: 100 BPM | DIASTOLIC BLOOD PRESSURE: 103 MMHG | OXYGEN SATURATION: 98 % | BODY MASS INDEX: 30.13 KG/M2

## 2025-02-24 DIAGNOSIS — I1A.0 RESISTANT HYPERTENSION: Primary | ICD-10-CM

## 2025-02-24 PROBLEM — I10 ESSENTIAL HYPERTENSION: Status: ACTIVE | Noted: 2025-02-24

## 2025-02-24 PROCEDURE — 99214 OFFICE O/P EST MOD 30 MIN: CPT | Performed by: INTERNAL MEDICINE

## 2025-02-24 NOTE — PROGRESS NOTES
Cardiology Office Visit      Encounter Date:  2025    PATIENT IDENTIFICATION    Name: Juan Couch  Age: 54 y.o. Sex: male : 1970  MRN: [id number]    Reason For Followup:  Resistant hypertension    Brief Clinical History:  Dear Tano Palomares APRN    I had the pleasure of seeing Juan Couch today. As you are well aware, this is a 54 y.o. male with no established history of ischemic heart disease.  He does have a history of hypertension and erectile dysfunction.  He presents today for follow-up on the above conditions.    Interval History:  2024                                          He has had some issues with blood pressure for about 10 years. He was having a physical and his blood pressure was so high, that he was sent to hospital.     Recently he started on testosterone replacement about a year ago. He reports that his home readings are generally in the 150s to 160s.     He works out and tries to eat cleanly.     No chest pain. No shortness of breath. No palps.    Has a family history of HTN and strokes.    2025        He reports that he feels crappy when he gets down into the one-teens. We discussed options and will cut back on the amlodipine to 5 mg daily and continue with edarbychlor as prescribed. We may have to tolerate a little higher pressure such that he is able to be functional. He is used to being markedly elevated on his pressures.    Assessment & Plan    Impressions:  Hypertension, resistant  Erectile dysfunction  Hypotestosteronism  Abnormal EKG with LVH    Recommendations:  Continuation of his current cardiovascular regimen at the present time.     This includes antihypertensives  Decrease amlodipine to 5 mg daily  Monitor blood pressure and log values for next visit  Call with blood pressure values in 2 weeks  Follow through with remainder of noninvasive testing  Follow-up in 6 months time sooner should there be difficulties      Diagnoses and all orders for  "this visit:    1. Resistant hypertension (Primary)          Objective:    Vitals:  Vitals:    02/24/25 1430   BP: (!) 171/103   BP Location: Left arm   Patient Position: Sitting   Cuff Size: Large Adult   Pulse: 100   SpO2: 98%   Weight: 87.1 kg (192 lb)   Height: 170.2 cm (67\")     Body mass index is 30.07 kg/m².      Physical Exam:    General: Alert, cooperative, no distress, appears stated age  Head:  Normocephalic, atraumatic, mucous membranes moist  Eyes:  Conjunctiva/corneas clear, EOM's intact     Neck:  Supple,  no bruit    Lungs: Clear to auscultation bilaterally, no wheezes rhonchi rales are noted  Chest wall: No tenderness  Heart::  Regular rate and rhythm, S1 and S2 normal, 1/6 holosystolic murmur.  No rub or gallop  Abdomen: Soft, non-tender, nondistended bowel sounds active  Extremities: No cyanosis, clubbing, or edema  Pulses: 2+ and symmetric all extremities  Skin:  No rashes or lesions  Neuro/psych: A&O x3. CN II through XII are grossly intact with appropriate affect      Allergies:  No Known Allergies    Medication Review:     Current Outpatient Medications:     acetaminophen (TYLENOL) 325 MG tablet, Take 2 tablets by mouth Every 6 (Six) Hours As Needed for Mild Pain., Disp: , Rfl:     amLODIPine (NORVASC) 10 MG tablet, Take 1 tablet by mouth Daily., Disp: 90 tablet, Rfl: 3    Azilsartan-Chlorthalidone (Edarbyclor) 40-25 MG tablet, Take 1 tablet by mouth Daily., Disp: 90 tablet, Rfl: 3    cloNIDine (CATAPRES) 0.1 MG tablet, TAKE 1 TABLET BY MOUTH TWICE DAILY AS NEEDED FOR HIGH BLOOD PRESSURE, Disp: 90 tablet, Rfl: 0    sildenafil (VIAGRA) 25 MG tablet, Take 2 tablets by mouth Daily As Needed for Erectile Dysfunction., Disp: 12 tablet, Rfl: 2    Testosterone Cypionate (DEPOTESTOTERONE CYPIONATE) 200 MG/ML injection, INJECT 0.5 MLS INTO THE MUSCLE ONCE WEEKLY, Disp: , Rfl:     Family History:  Family History   Problem Relation Age of Onset    Hypertension Mother     Stroke Mother     Hypertension " Brother     Hypertension Maternal Uncle     Stroke Maternal Uncle     Hypertension Maternal Grandfather     Stroke Maternal Grandfather        Past Medical History:  Past Medical History:   Diagnosis Date    Fractured shoulder 06/16/2021    right    Hypertension        Past Surgical History:  Past Surgical History:   Procedure Laterality Date    CLAVICLE OPEN REDUCTION INTERNAL FIXATION Right 6/28/2021    Procedure: RIGHT CLAVICLE AND ORIF;  Surgeon: Jimmie Montilla MD;  Location: [facility];  Service: Orthopedics;  Laterality: Right;    DISTAL BICEPS TENDON REPAIR Right 6/28/2021    Procedure: PECTORALIS MAJOR REPAIR;  Surgeon: Jimmie Montilla MD;  Location: [facility] OR;  Service: Orthopedics;  Laterality: Right;       Social History:  Social History     Socioeconomic History    Marital status:    Tobacco Use    Smoking status: Never     Passive exposure: Past    Smokeless tobacco: Current     Types: Snuff   Vaping Use    Vaping status: Every Day    Substances: Flavoring    Devices: Disposable    Passive vaping exposure: Yes   Substance and Sexual Activity    Alcohol use: Yes     Comment: Rare    Drug use: Never    Sexual activity: Defer       Review of Systems:  The following systems were reviewed as they relate to the cardiovascular system: Constitutional, Eyes, ENT, Cardiovascular, Respiratory, Gastrointestinal, Integumentary, Neurological, Psychiatric, Hematologic, Endocrine, Musculoskeletal, and Genitourinary. The pertinent cardiovascular findings are reported above with all other cardiovascular points within those systems being negative.    Diagnostic Study Review:     Current Electrocardiogram:  Procedures no new EKG.  EKG dated 16 December 2024 demonstrates sinus rhythm with a ventricular rate of 88 bpm.    Laboratory Data:  Lab Results   Component Value Date    GLUCOSE 91 06/23/2021    BUN 18 06/23/2021    CREATININE 1.06 10/16/2023    EGFRIFNONA 116 06/23/2021    BCR 25.0  "06/23/2021    K 4.4 06/23/2021    CO2 29.5 (H) 06/23/2021    CALCIUM 9.7 06/23/2021    ALBUMIN 5.00 06/23/2021     Lab Results   Component Value Date    GLUCOSE 91 06/23/2021    CALCIUM 9.7 06/23/2021     06/23/2021    K 4.4 06/23/2021    CO2 29.5 (H) 06/23/2021    CL 98 06/23/2021    BUN 18 06/23/2021    CREATININE 1.06 10/16/2023    EGFRIFNONA 116 06/23/2021    BCR 25.0 06/23/2021    ANIONGAP 11.5 06/23/2021     Lab Results   Component Value Date    WBC 5.22 10/16/2023    HGB 15.5 10/16/2023    HCT 44.5 10/16/2023    MCV 88 10/16/2023     10/16/2023     No results found for: \"CHOL\", \"CHLPL\", \"TRIG\", \"HDL\", \"LDL\", \"LDLDIRECT\"  Lab Results   Component Value Date    HGBA1C 4.9 10/16/2023     Lab Results   Component Value Date    INR 0.98 06/23/2021    PROTIME 10.9 06/23/2021       Most Recent Echo:  Results for orders placed during the hospital encounter of 01/08/25    Adult Transthoracic Echo Complete W/ Cont if Necessary Per Protocol    Interpretation Summary    Left ventricular systolic function is normal. Left ventricular ejection fraction appears to be 66 - 70%.    Left ventricular wall thickness is consistent with mild concentric hypertrophy.    Left ventricular diastolic function is consistent with (grade I) impaired relaxation.    The left atrial cavity is moderately dilated.    Estimated right ventricular systolic pressure from tricuspid regurgitation is normal (<35 mmHg).       Most Recent Stress Test:       Most Recent Cardiac Catheterization:   No results found for this or any previous visit.       NOTE: The following portions of the patient's note were reviewed, confirmed and/or updated this visit as appropriate: History of present illness/Interval history, physical examination, assessment & plan, allergies, current medications, past family history, past medical history, past social history, past surgical history and problem list.    Labs pertinent to today's visit on 02/24/2025 (including " "but not limited to CBC, CMP, and lipid profiles) were requested from the patient's primary care provider/hospital/clinical laboratory.  If the labs were available for the visit, they were reviewed with the patient.  If they were not available, when received, special interest will be made to the labs pertinent to this visit.  The patient's most recent \"in-house\" labs are noted below and have been reviewed.  Outside labs pertinent to this visit are scanned into the record and have been reviewed.    Discussions held today, 02/24/2025,regarding procedures included risk, benefits, and options including but not limited to: Death, MI, stroke, pain, bleeding, infection, and possible need for vascular/thoracic/cardiothoracic surgery.    Copied information within this note was reviewed and is current as of 02/24/2025.    Assessment and plan noted herein represents the current plan of care as of 02/24/2025.    Significant resources from our office and staff are inherent in engaging this patient in a continuous and active collaborative plan of care related to their chronic cardiovascular conditions outlined herein.  The management of these conditions requires the direction of our service with specialized clinical knowledge, skills, and experience.  This collaborative care includes but is not limited to patient education, expectations and responsibilities, shared decision making around therapeutic goals, and shared commitments to achieve those goals.  "

## 2025-02-24 NOTE — PATIENT INSTRUCTIONS
Decrease amlodipine to 5 mg daily  Monitor blood pressure and log values for next visit  Call with values in 2 weeks  Follow-up 6 months

## 2025-03-19 ENCOUNTER — HOSPITAL ENCOUNTER (OUTPATIENT)
Dept: CT IMAGING | Facility: HOSPITAL | Age: 55
Discharge: HOME | End: 2025-03-19

## 2025-03-19 ENCOUNTER — HOSPITAL ENCOUNTER (OUTPATIENT)
Dept: CARDIOLOGY | Facility: HOSPITAL | Age: 55
Discharge: HOME | End: 2025-03-19
Admitting: INTERNAL MEDICINE
Payer: COMMERCIAL

## 2025-03-19 ENCOUNTER — HOSPITAL ENCOUNTER (OUTPATIENT)
Dept: CARDIOLOGY | Facility: HOSPITAL | Age: 55
Discharge: HOME | End: 2025-03-19

## 2025-03-19 DIAGNOSIS — Z13.9 SCREENING DUE: ICD-10-CM

## 2025-03-19 DIAGNOSIS — I1A.0 RESISTANT HYPERTENSION: ICD-10-CM

## 2025-03-19 LAB
BH CV ECHO MEAS - DIST REN A EDV LEFT: 33.9 CM/S
BH CV ECHO MEAS - DIST REN A PSV LEFT: 80.9 CM/S
BH CV ECHO MEAS - MID REN A EDV LEFT: 35.4 CM/S
BH CV ECHO MEAS - MID REN A PSV LEFT: 88.6 CM/S
BH CV ECHO MEAS - PROX REN A EDV LEFT: 15 CM/S
BH CV ECHO MEAS - PROX REN A PSV LEFT: 38.9 CM/S
BH CV VAS KIDNEY HEIGHT LEFT: 5.2 CM
BH CV VAS RENAL AORTIC MID EDV: 17.2 CM/S
BH CV VAS RENAL AORTIC MID PSV: 124 CM/S
BH CV VAS RENAL HILUM LEFT EDV: 23.5 CM/S
BH CV VAS RENAL HILUM LEFT PSV: 69.6 CM/S
BH CV VAS RENAL HILUM RIGHT EDV: 38.7 CM/S
BH CV VAS RENAL HILUM RIGHT PSV: 101 CM/S
BH CV VAS SCREENING CAROTID CCA LEFT: 128 CM/SEC
BH CV VAS SCREENING CAROTID CCA RIGHT: 105 CM/SEC
BH CV VAS SCREENING CAROTID ICA LEFT: 88 CM/SEC
BH CV VAS SCREENING CAROTID ICA RIGHT: 164 CM/SEC
BH CV XLRA MEAS - KID L LEFT: 10.2 CM
BH CV XLRA MEAS - MID AO DIAM: 2.2 CM
BH CV XLRA MEAS - PAD LEFT ABI PT: NORMAL
BH CV XLRA MEAS - PAD LEFT ARM: 171 MMHG
BH CV XLRA MEAS - PAD LEFT LEG PT: NORMAL MMHG
BH CV XLRA MEAS - PAD RIGHT ABI PT: NORMAL
BH CV XLRA MEAS - PAD RIGHT ARM: 172 MMHG
BH CV XLRA MEAS - PAD RIGHT LEG PT: NORMAL MMHG
BH CV XLRA MEAS DIST REN A EDV RIGHT: 40.6 CM/S
BH CV XLRA MEAS DIST REN A PSV RIGHT: 117 CM/S
BH CV XLRA MEAS KID H RIGHT: 4.3 CM
BH CV XLRA MEAS KID L RIGHT: 9.4 CM
BH CV XLRA MEAS LEFT DIST CCA EDV: 32.9 CM/SEC
BH CV XLRA MEAS LEFT DIST CCA PSV: 128 CM/SEC
BH CV XLRA MEAS LEFT ICA/CCA RATIO: 0.7
BH CV XLRA MEAS LEFT PROX ICA EDV: -24.5 CM/SEC
BH CV XLRA MEAS LEFT PROX ICA PSV: -87.6 CM/SEC
BH CV XLRA MEAS MID REN A EDV RIGHT: 38.1 CM/S
BH CV XLRA MEAS MID REN A PSV RIGHT: 117 CM/S
BH CV XLRA MEAS PROX REN A EDV RIGHT: 33.8 CM/S
BH CV XLRA MEAS PROX REN A PSV RIGHT: 109 CM/S
BH CV XLRA MEAS RAR LEFT: 0.71
BH CV XLRA MEAS RAR RIGHT: 0.94
BH CV XLRA MEAS RENAL A ORG EDV LEFT: 15.4 CM/S
BH CV XLRA MEAS RENAL A ORG EDV RIGHT: 39.9 CM/S
BH CV XLRA MEAS RENAL A ORG PSV LEFT: 63.2 CM/S
BH CV XLRA MEAS RENAL A ORG PSV RIGHT: 130 CM/S
BH CV XLRA MEAS RIGHT DIST CCA EDV: 27.4 CM/SEC
BH CV XLRA MEAS RIGHT DIST CCA PSV: 105 CM/SEC
BH CV XLRA MEAS RIGHT ICA/CCA RATIO: 1.6
BH CV XLRA MEAS RIGHT PROX ICA EDV: -24.1 CM/SEC
BH CV XLRA MEAS RIGHT PROX ICA PSV: -164 CM/SEC
LEFT RENAL UPPER PARENCHYMA MAX: 33.2 CM/S
LEFT RENAL UPPER PARENCHYMA MIN: 9.6 CM/S
LEFT RENAL UPPER PARENCHYMA RI: 0.71
RIGHT RENAL UPPER PARENCHYMA MAX: 24.3 CM/S
RIGHT RENAL UPPER PARENCHYMA MIN: 9.9 CM/S
RIGHT RENAL UPPER PARENCHYMA RI: 0.59

## 2025-03-19 PROCEDURE — 75571 CT HRT W/O DYE W/CA TEST: CPT

## 2025-03-19 PROCEDURE — VASCULARSCN2 VASCULAR SCREENING (BUNDLE) CAR: Performed by: SURGERY

## 2025-03-19 PROCEDURE — 93975 VASCULAR STUDY: CPT

## 2025-03-19 PROCEDURE — 93799 UNLISTED CV SVC/PROCEDURE: CPT

## 2025-05-14 DIAGNOSIS — N52.9 ERECTILE DYSFUNCTION, UNSPECIFIED ERECTILE DYSFUNCTION TYPE: ICD-10-CM

## 2025-05-14 DIAGNOSIS — Z76.0 MEDICATION REFILL: ICD-10-CM

## 2025-05-15 RX ORDER — SILDENAFIL 25 MG/1
50 TABLET, FILM COATED ORAL DAILY PRN
Qty: 12 TABLET | Refills: 2 | Status: SHIPPED | OUTPATIENT
Start: 2025-05-15

## (undated) DEVICE — UNDERGLV SURG BIOGEL INDICAT PI SZ8 BLU

## (undated) DEVICE — SUT VIC 2/0 CP2 CR8 18IN J762D

## (undated) DEVICE — GOWN,PREVENTION PLUS,XXLARGE,STERILE: Brand: MEDLINE

## (undated) DEVICE — ZIP 16 SURGICAL SKIN CLOSURE DEVICE, PSA: Brand: ZIP 16 SURGICAL SKIN CLOSURE DEVICE

## (undated) DEVICE — SOL IRRIG NACL 1000ML

## (undated) DEVICE — 3M™ STERI-DRAPE™ U-DRAPE 1015: Brand: STERI-DRAPE™

## (undated) DEVICE — DECANTER: Brand: UNBRANDED

## (undated) DEVICE — APPL CHLORAPREP HI/LITE TINTED 10.5ML ORNG

## (undated) DEVICE — GOWN,PREVENTION PLUS,XLARGE,STERILE: Brand: MEDLINE

## (undated) DEVICE — BONE SCREW
Type: IMPLANTABLE DEVICE | Site: CLAVICLE | Status: NON-FUNCTIONAL
Brand: VARIAX
Removed: 2021-06-28

## (undated) DEVICE — GLV SURG BIOGEL LTX PF 8

## (undated) DEVICE — DRAPE,LAPAROTOMY,PCH,STERILE: Brand: MEDLINE

## (undated) DEVICE — DRSNG TELFA PAD NONADH STR 1S 3X4IN

## (undated) DEVICE — CUSTOM PACK: Brand: UNBRANDED

## (undated) DEVICE — KT SURG TURNOVER 050

## (undated) DEVICE — SPNG LAP PREWSH SFTPK 18X18IN STRL PK/5

## (undated) DEVICE — PK EXTREM 50

## (undated) DEVICE — INTENDED FOR TISSUE SEPARATION, AND OTHER PROCEDURES THAT REQUIRE A SHARP SURGICAL BLADE TO PUNCTURE OR CUT.: Brand: BARD-PARKER ® CARBON RIB-BACK BLADES

## (undated) DEVICE — DRILL BIT, AO DIA2.6MM X 135MM, SCALED: Brand: VARIAX

## (undated) DEVICE — GLV SURG SIGNATURE ESSENTIAL PF LTX SZ7

## (undated) DEVICE — SOLUTION,WATER,IRRIGATION,1000ML,STERILE: Brand: MEDLINE

## (undated) DEVICE — PENCL EVAC ULTRAVAC SMOKE W/BLD

## (undated) DEVICE — DRSNG WND GZ PAD BORDERED 4X8IN STRL

## (undated) DEVICE — UNDERGLV SURG BIOGEL INDICAT PF 8 GRN

## (undated) DEVICE — 3M™ IOBAN™ 2 ANTIMICROBIAL INCISE DRAPE 6650EZ: Brand: IOBAN™ 2